# Patient Record
Sex: FEMALE | Race: WHITE | Employment: FULL TIME | URBAN - METROPOLITAN AREA
[De-identification: names, ages, dates, MRNs, and addresses within clinical notes are randomized per-mention and may not be internally consistent; named-entity substitution may affect disease eponyms.]

---

## 2017-08-04 PROBLEM — Z00.00 PHYSICAL EXAM: Status: ACTIVE | Noted: 2017-08-04

## 2017-11-18 PROBLEM — Z23 IMMUNIZATION DUE: Status: ACTIVE | Noted: 2017-11-18

## 2018-09-26 PROBLEM — Z00.00 PHYSICAL EXAM: Status: RESOLVED | Noted: 2017-08-04 | Resolved: 2018-09-26

## 2020-03-25 ENCOUNTER — TELEPHONE (OUTPATIENT)
Dept: FAMILY MEDICINE CLINIC | Age: 25
End: 2020-03-25

## 2020-03-25 NOTE — TELEPHONE ENCOUNTER
MA spoke with pt and informed her to contact office when she is 72 hours fever free without fever reducer.

## 2020-03-25 NOTE — TELEPHONE ENCOUNTER
Pt stated that she is feeling better today but still has a sore throat, nasal drainage, cough and congestion.

## 2020-03-25 NOTE — TELEPHONE ENCOUNTER
Patient states she has a sore throat, runny nose, slight cough, congestion, headache, fever. No SOB, nausea. Patient requesting to go back to work tomorrow (3/26/2020), wants an excuse for 3/23-today.

## 2020-03-25 NOTE — TELEPHONE ENCOUNTER
Patient called in stating she had a sore throat and fever and she needs a work excuse to return to work. She wants to know if she can just come into the office to pick it up? Please, advise. Thank you.

## 2020-03-27 ENCOUNTER — TELEPHONE (OUTPATIENT)
Dept: FAMILY MEDICINE CLINIC | Age: 25
End: 2020-03-27

## 2020-04-23 ENCOUNTER — HOSPITAL ENCOUNTER (EMERGENCY)
Age: 25
Discharge: HOME | End: 2020-04-23
Attending: EMERGENCY MEDICINE
Payer: COMMERCIAL

## 2020-04-23 ENCOUNTER — APPOINTMENT (OUTPATIENT)
Dept: GENERAL RADIOLOGY | Age: 25
End: 2020-04-23
Payer: COMMERCIAL

## 2020-04-23 VITALS
DIASTOLIC BLOOD PRESSURE: 70 MMHG | RESPIRATION RATE: 14 BRPM | TEMPERATURE: 98 F | HEART RATE: 94 BPM | OXYGEN SATURATION: 98 % | HEIGHT: 60 IN | BODY MASS INDEX: 21.99 KG/M2 | WEIGHT: 112 LBS | SYSTOLIC BLOOD PRESSURE: 108 MMHG

## 2020-04-23 PROCEDURE — 99283 EMERGENCY DEPT VISIT LOW MDM: CPT

## 2020-04-23 PROCEDURE — 73110 X-RAY EXAM OF WRIST: CPT

## 2020-04-23 RX ORDER — IBUPROFEN 800 MG/1
800 TABLET ORAL EVERY 6 HOURS PRN
Qty: 12 TABLET | Refills: 0 | Status: SHIPPED | OUTPATIENT
Start: 2020-04-23 | End: 2020-09-09 | Stop reason: ALTCHOICE

## 2020-04-23 ASSESSMENT — PAIN DESCRIPTION - FREQUENCY: FREQUENCY: CONTINUOUS

## 2020-04-23 ASSESSMENT — PAIN DESCRIPTION - ONSET: ONSET: ON-GOING

## 2020-04-23 ASSESSMENT — PAIN DESCRIPTION - PAIN TYPE: TYPE: ACUTE PAIN

## 2020-04-23 ASSESSMENT — PAIN - FUNCTIONAL ASSESSMENT: PAIN_FUNCTIONAL_ASSESSMENT: PREVENTS OR INTERFERES SOME ACTIVE ACTIVITIES AND ADLS

## 2020-04-23 ASSESSMENT — PAIN DESCRIPTION - DESCRIPTORS: DESCRIPTORS: SHARP

## 2020-04-23 ASSESSMENT — PAIN DESCRIPTION - PROGRESSION: CLINICAL_PROGRESSION: GRADUALLY WORSENING

## 2020-04-23 ASSESSMENT — PAIN SCALES - GENERAL: PAINLEVEL_OUTOF10: 7

## 2020-04-23 ASSESSMENT — PAIN DESCRIPTION - ORIENTATION: ORIENTATION: RIGHT

## 2020-04-23 ASSESSMENT — PAIN DESCRIPTION - LOCATION: LOCATION: WRIST

## 2020-04-23 NOTE — LETTER
[address] [facility] Emergency Department  [id number] [facility] [address]  Phone: [phone]               April 23, 2020    Patient: Ellen Honeycutt   YOB: 1995   Date of Visit: 4/23/2020       To Whom It May Concern:    Radha Sahu was seen and treated in our emergency department on 4/23/2020. She may return to work on 04/25/2020 with no restrictions.       Sincerely,     MD Roula Hartman RN         Signature:__________________________________

## 2020-04-23 NOTE — ED PROVIDER NOTES
HPI:  4/23/20,   Time: 9:21 AM EDT         Edith Melendez is a 22 y.o. female presenting to the ED for wrist injury, beginning more than 1 day ago. The complaint has been persistent, moderate in severity, and worsened by nothing, movement of hand and wrist.  Patient states she fell the bathtub injuring her right wrist a couple of days ago. There is no gross deformity. ROS:   Pertinent positives and negatives are stated within HPI, all other systems reviewed and are negative.  --------------------------------------------- PAST HISTORY ---------------------------------------------  Past Medical History:  has no past medical history on file. Past Surgical History:  has no past surgical history on file. Social History:  reports that she has been smoking cigarettes. She has been smoking about 0.50 packs per day. She has never used smokeless tobacco. She reports that she does not drink alcohol or use drugs. Family History: family history includes No Known Problems in her father and mother. The patients home medications have been reviewed. Allergies: Patient has no known allergies. -------------------------------------------------- RESULTS -------------------------------------------------  All laboratory and radiology results have been personally reviewed by myself   LABS:  No results found for this visit on 04/23/20. RADIOLOGY:  Interpreted by Radiologist.  XR WRIST RIGHT (MIN 3 VIEWS)   Final Result   No acute osseous abnormality seen. ------------------------- NURSING NOTES AND VITALS REVIEWED ---------------------------   The nursing notes within the ED encounter and vital signs as below have been reviewed.    /70   Pulse 94   Temp 98 °F (36.7 °C) (Oral)   Resp 14   Ht 5' (1.524 m)   Wt 112 lb (50.8 kg)   SpO2 98%   BMI 21.87 kg/m²   Oxygen Saturation Interpretation: Normal      ---------------------------------------------------PHYSICAL EXAM--------------------------------------      Constitutional/General: Alert and oriented x3, well appearing, non toxic in NAD  Head: NC/AT  Eyes: PERRL, EOMI  Mouth: Oropharynx clear, handling secretions, no trismus  Neck: Supple, full ROM, no meningeal signs  Pulmonary: Lungs clear to auscultation bilaterally, no wheezes, rales, or rhonchi. Not in respiratory distress  Cardiovascular:  Regular rate and rhythm, no murmurs, gallops, or rubs. 2+ distal pulses  Abdomen: Soft, non tender, non distended,   Extremities: Moves all extremities x 4. Warm and well perfused; patient has good radial pulse. She is tender over the ulnar styloid area of the right wrist there is no gross bony deformity. She is not tender over the snuffbox. Median radial and ulnar nerves were checked and are intact. She does have limited dorsiflexion due to pain. There is good capillary refill. Skin: warm and dry without rash  Neurologic: GCS 15,  Psych: Normal Affect      ------------------------------ ED COURSE/MEDICAL DECISION MAKING----------------------  Medications - No data to display      Medical Decision Making:   Exam and x-ray, x-ray was negative for fracture. Patient will be splinted and discharged with anti-inflammatories. She is advised that if pain continues may need MRI or orthopedic consult    Counseling: The emergency provider has spoken with the patient and discussed todays results, in addition to providing specific details for the plan of care and counseling regarding the diagnosis and prognosis. Questions are answered at this time and they are agreeable with the plan.      --------------------------------- IMPRESSION AND DISPOSITION ---------------------------------    IMPRESSION  1.  Sprain of right wrist, initial encounter        DISPOSITION  Disposition: Discharge to home  Patient condition is fair                  Redd Amaya MD  04/23/20 [id number]

## 2020-04-24 ENCOUNTER — TELEPHONE (OUTPATIENT)
Dept: PRIMARY CARE CLINIC | Age: 25
End: 2020-04-24

## 2020-04-27 NOTE — TELEPHONE ENCOUNTER
Second attempt to call the patient for ED follow up. There was no answer. Left message for the patient, if their symptoms worsened or persists, they were instructed to call their PCP or return to the ED for evaluation.      Christie Walter RN, [employer]

## 2020-07-13 ENCOUNTER — TELEPHONE (OUTPATIENT)
Dept: FAMILY MEDICINE CLINIC | Age: 25
End: 2020-07-13

## 2020-07-13 NOTE — TELEPHONE ENCOUNTER
Pt called in she felll down the stairs and went to [facility] where she got stitches in her LT knee and wanted to know if Dr. Kayy Kwon would be able to give her a work excuse? Please, advise.

## 2020-07-13 NOTE — TELEPHONE ENCOUNTER
Patient calling back, states that she called this morning and hasn't heard back from the office yet. I tried calling the office but didn't get an answer, told her that she called around 1:40 today and that the office hasn't had time to address her. I told her that I would put in another PE, and that someone would get back with her soon. Shona Coleman

## 2020-07-13 NOTE — TELEPHONE ENCOUNTER
Patient notified we are unable to provide a work excuse. She has not been seen in the office regarding this injury.   Electronically signed by Amada Samaniego on 7/13/2020 at 3:43 PM

## 2020-09-09 ENCOUNTER — OFFICE VISIT (OUTPATIENT)
Dept: FAMILY MEDICINE CLINIC | Age: 25
End: 2020-09-09
Payer: COMMERCIAL

## 2020-09-09 VITALS
RESPIRATION RATE: 16 BRPM | HEART RATE: 79 BPM | DIASTOLIC BLOOD PRESSURE: 62 MMHG | TEMPERATURE: 98.3 F | HEIGHT: 60 IN | BODY MASS INDEX: 20.18 KG/M2 | SYSTOLIC BLOOD PRESSURE: 100 MMHG | OXYGEN SATURATION: 97 % | WEIGHT: 102.8 LBS

## 2020-09-09 PROCEDURE — 99213 OFFICE O/P EST LOW 20 MIN: CPT | Performed by: FAMILY MEDICINE

## 2020-09-09 ASSESSMENT — ENCOUNTER SYMPTOMS
CHEST TIGHTNESS: 0
TROUBLE SWALLOWING: 0
EYE DISCHARGE: 0
CHOKING: 0
BLOOD IN STOOL: 0
SHORTNESS OF BREATH: 0
DIARRHEA: 0
BACK PAIN: 0
FACIAL SWELLING: 0
STRIDOR: 0
WHEEZING: 0
VOMITING: 0
APNEA: 0
SINUS PRESSURE: 0
NAUSEA: 0
ALLERGIC/IMMUNOLOGIC NEGATIVE: 1
ANAL BLEEDING: 0
VOICE CHANGE: 0
COLOR CHANGE: 0
SINUS PAIN: 0
EYE PAIN: 0
RESPIRATORY NEGATIVE: 1
RECTAL PAIN: 0
SORE THROAT: 0
GASTROINTESTINAL NEGATIVE: 1
EYE REDNESS: 0
COUGH: 0
RHINORRHEA: 0
PHOTOPHOBIA: 0
EYE ITCHING: 0
BLURRED VISION: 0
ORTHOPNEA: 0
ABDOMINAL PAIN: 0
ABDOMINAL DISTENTION: 0
CONSTIPATION: 0

## 2020-09-09 ASSESSMENT — PATIENT HEALTH QUESTIONNAIRE - PHQ9
SUM OF ALL RESPONSES TO PHQ9 QUESTIONS 1 & 2: 0
2. FEELING DOWN, DEPRESSED OR HOPELESS: 0
SUM OF ALL RESPONSES TO PHQ QUESTIONS 1-9: 0
1. LITTLE INTEREST OR PLEASURE IN DOING THINGS: 0
SUM OF ALL RESPONSES TO PHQ QUESTIONS 1-9: 0

## 2020-09-09 NOTE — PROGRESS NOTES
SUBJECTIVE  Yue Estrella is a 26 y.o. female. HPI/Chief C/O:  Chief Complaint   Patient presents with    Annual Exam     Physical    Weight Management     Patient wants to discuss medications to help her gain weight. No Known Allergies  The patient is here for a medication list and treatment planning review  We will go over our care planning goals as well as take care of all refills  We will set up labs as well   She C/O having trouble gaining weight    Hypertension   Pertinent negatives include no anxiety, blurred vision, chest pain, headaches, malaise/fatigue, neck pain, orthopnea, palpitations, peripheral edema, PND, shortness of breath or sweats. Past treatments include lifestyle changes. The current treatment provides significant improvement. Compliance problems include diet. There is no history of angina, kidney disease, CAD/MI, CVA, heart failure, left ventricular hypertrophy, PVD or retinopathy. There is no history of chronic renal disease, coarctation of the aorta, hyperaldosteronism, hypercortisolism, hyperparathyroidism, a hypertension causing med, pheochromocytoma, renovascular disease, sleep apnea or a thyroid problem. ROS:  Review of Systems   Constitutional: Negative. Negative for activity change, appetite change, chills, diaphoresis, fatigue, fever, malaise/fatigue and unexpected weight change. HENT: Negative. Negative for congestion, dental problem, drooling, ear discharge, ear pain, facial swelling, hearing loss, mouth sores, nosebleeds, postnasal drip, rhinorrhea, sinus pressure, sinus pain, sneezing, sore throat, tinnitus, trouble swallowing and voice change. Eyes: Negative for blurred vision, photophobia, pain, discharge, redness, itching and visual disturbance. Respiratory: Negative. Negative for apnea, cough, choking, chest tightness, shortness of breath, wheezing and stridor. Cardiovascular: Negative.   Negative for chest pain, palpitations, orthopnea, leg distension. Palpations: Abdomen is soft. There is no mass. Tenderness: There is no abdominal tenderness. There is no right CVA tenderness, left CVA tenderness, guarding or rebound. Hernia: No hernia is present. Genitourinary:     Vagina: Normal.   Musculoskeletal: Normal range of motion. General: No swelling, tenderness, deformity or signs of injury. Right lower leg: No edema. Left lower leg: No edema. Legs:       Comments: Large scar    Lymphadenopathy:      Cervical: No cervical adenopathy. Skin:     General: Skin is warm. Coloration: Skin is not jaundiced or pale. Findings: No bruising, erythema, lesion or rash. Neurological:      General: No focal deficit present. Mental Status: She is alert and oriented to person, place, and time. Cranial Nerves: No cranial nerve deficit. Sensory: No sensory deficit. Motor: No weakness or abnormal muscle tone. Coordination: Coordination normal.      Gait: Gait normal.      Deep Tendon Reflexes: Reflexes are normal and symmetric. Reflexes normal.   Psychiatric:         Mood and Affect: Mood normal.         Behavior: Behavior normal.         Thought Content: Thought content normal.         Judgment: Judgment normal.         ASSESSMENT/PLAN  Debbie was seen today for annual exam and weight management. Diagnoses and all orders for this visit:    Well adult exam  --talk on diet, exercise, vitamins, smoking, drugs alcohol, sexual activity     IFG (impaired fasting glucose)  -     Comprehensive Metabolic Panel; Future  -     CBC Auto Differential; Future  -     Hemoglobin A1C; Future    Dyslipidemia  -     Comprehensive Metabolic Panel; Future  -     Lipid Panel;  Future  -     CBC Auto Differential; Future  --Mediterranean diet, exercise, weight loss, vitamins    We have a long talk on cholesterol and importance of lowering it   --add Ensure for weight gain     Fatigue, unspecified type  -     TSH without Reflex; Future  -     Comprehensive Metabolic Panel; Future  -     CBC Auto Differential; Future        Outpatient Encounter Medications as of 9/9/2020   Medication Sig Dispense Refill    [DISCONTINUED] ibuprofen (ADVIL;MOTRIN) 800 MG tablet Take 1 tablet by mouth every 6 hours as needed for Pain 12 tablet 0     No facility-administered encounter medications on file as of 9/9/2020. Return in about 4 weeks (around 10/7/2020).         Reviewed recent labs related to [address] [facility] [address] current problems      Discussed importance of regular Health Maintenance follow up  Health Maintenance   Topic    Pneumococcal 0-64 years Vaccine (1 of 1 - PPSV23)    HIV screen     Chlamydia screen     Varicella vaccine (2 of 2 - 13+ 2-dose series)    Cervical cancer screen     Flu vaccine (1)    DTaP/Tdap/Td vaccine (7 - Td)    Hepatitis B vaccine     Hib vaccine     HPV vaccine     Meningococcal (ACWY) vaccine     Hepatitis A vaccine

## 2020-09-09 NOTE — PATIENT INSTRUCTIONS
Patient Education        Eating Healthy Foods: Care Instructions  Your Care Instructions     Eating healthy foods can help lower your risk for disease. Healthy food gives you energy and keeps your heart strong, your brain active, your muscles working, and your bones strong. A healthy diet includes a variety of foods from the basic food groups: grains, vegetables, fruits, milk and milk products, and meat and beans. Some people may eat more of their favorite foods from only one food group and, as a result, miss getting the nutrients they need. So, it is important to pay attention not only to what you eat but also to what you are missing from your diet. You can eat a healthy, balanced diet by making a few small changes. Follow-up care is a key part of your treatment and safety. Be sure to make and go to all appointments, and call your doctor if you are having problems. It's also a good idea to know your test results and keep a list of the medicines you take. How can you care for yourself at home? Look at what you eat  · Keep a food diary for a week or two and record everything you eat or drink. Track the number of servings you eat from each food group. · For a balanced diet every day, eat a variety of:  ? 6 or more ounce-equivalents of grains, such as cereals, breads, crackers, rice, or pasta, every day. An ounce-equivalent is 1 slice of bread, 1 cup of ready-to-eat cereal, or ½ cup of cooked rice, cooked pasta, or cooked cereal.  ? 2½ cups of vegetables, especially:  § Dark-green vegetables such as broccoli and spinach. § Orange vegetables such as carrots and sweet potatoes. § Dry beans (such as omer and kidney beans) and peas (such as lentils). ? 2 cups of fresh, frozen, or canned fruit. A small apple or 1 banana or orange equals 1 cup. ? 3 cups of nonfat or low-fat milk, yogurt, or other milk products. ? 5½ ounces of meat and beans, such as chicken, fish, lean meat, beans, nuts, and seeds.  One egg, 1 home?  · Reach and stay at a healthy weight. This will lower your risk for many problems, such as obesity, diabetes, heart disease, and high blood pressure. · Get at least 30 minutes of physical activity on most days of the week. Walking is a good choice. You also may want to do other activities, such as running, swimming, cycling, or playing tennis or team sports. Discuss any changes in your exercise program with your doctor. · Do not smoke or allow others to smoke around you. If you need help quitting, talk to your doctor about stop-smoking programs and medicines. These can increase your chances of quitting for good. · Talk to your doctor about whether you have any risk factors for sexually transmitted infections (STIs). Having one sex partner (who does not have STIs and does not have sex with anyone else) is a good way to avoid these infections. · Use birth control if you do not want to have children at this time. Talk with your doctor about the choices available and what might be best for you. · Protect your skin from too much sun. When you're outdoors from 10 a.m. to 4 p.m., stay in the shade or cover up with clothing and a hat with a wide brim. Wear sunglasses that block UV rays. Even when it's cloudy, put broad-spectrum sunscreen (SPF 30 or higher) on any exposed skin. · See a dentist one or two times a year for checkups and to have your teeth cleaned. · Wear a seat belt in the car. Follow your doctor's advice about when to have certain tests. These tests can spot problems early. For everyone  · Cholesterol. Have the fat (cholesterol) in your blood tested after age 21. Your doctor will tell you how often to have this done based on your age, family history, or other things that can increase your risk for heart disease. · Blood pressure. Have your blood pressure checked during a routine doctor visit.  Your doctor will tell you how often to check your blood pressure based on your age, your blood pressure results, and other factors. · Vision. Talk with your doctor about how often to have a glaucoma test.  · Diabetes. Ask your doctor whether you should have tests for diabetes. · Colon cancer. Your risk for colorectal cancer gets higher as you get older. Some experts say that adults should start regular screening at age 48 and stop at age 76. Others say to start before age 48 or continue after age 76. Talk with your doctor about your risk and when to start and stop screening. For women  · Breast exam and mammogram. Talk to your doctor about when you should have a clinical breast exam and a mammogram. Medical experts differ on whether and how often women under 50 should have these tests. Your doctor can help you decide what is right for you. · Cervical cancer screening test and pelvic exam. Begin with a Pap test at age 24. The test often is part of a pelvic exam. Starting at age 27, you may choose to have a Pap test, an HPV test, or both tests at the same time (called co-testing). Talk with your doctor about how often to have testing. · Tests for sexually transmitted infections (STIs). Ask whether you should have tests for STIs. You may be at risk if you have sex with more than one person, especially if your partners do not wear condoms. For men  · Tests for sexually transmitted infections (STIs). Ask whether you should have tests for STIs. You may be at risk if you have sex with more than one person, especially if you do not wear a condom. · Testicular cancer exam. Ask your doctor whether you should check your testicles regularly. · Prostate exam. Talk to your doctor about whether you should have a blood test (called a PSA test) for prostate cancer. Experts differ on whether and when men should have this test. Some experts suggest it if you are older than 39 and are -American or have a father or brother who got prostate cancer when he was younger than 72. When should you call for help?   Watch closely for changes in your health, and be sure to contact your doctor if you have any problems or symptoms that concern you. Where can you learn more? Go to https://chpeedvineweb.[email or web address] org and sign in to your [email or web address] account. Enter P072 in the TOMODO box to learn more about \"Well Visit, Ages 25 to 48: Care Instructions. \"     If you do not have an account, please click on the \"Sign Up Now\" link. Current as of: August 22, 2019               Content Version: 12.5  © 1869-0932 Healthwise, Incorporated. Care instructions adapted under license by [facility] ([facility]). If you have questions about a medical condition or this instruction, always ask your healthcare professional. [address] any warranty or liability for your use of this information.

## 2022-08-01 ENCOUNTER — HOSPITAL ENCOUNTER (OUTPATIENT)
Age: 27
Discharge: HOME | End: 2022-08-01
Payer: COMMERCIAL

## 2022-08-01 LAB
GLUCOSE TOLERANCE SCREEN 50G: 86 MG/DL (ref 70–140)
HCT VFR BLD CALC: 34.5 % (ref 34–48)
HEMOGLOBIN: 11.3 G/DL (ref 11.5–15.5)
MCH RBC QN AUTO: 31.7 PG (ref 26–35)
MCHC RBC AUTO-ENTMCNC: 32.8 % (ref 32–34.5)
MCV RBC AUTO: 96.9 FL (ref 80–99.9)
PDW BLD-RTO: 12.6 FL (ref 11.5–15)
PLATELET # BLD: 153 E9/L (ref 130–450)
PMV BLD AUTO: 10 FL (ref 7–12)
RBC # BLD: 3.56 E12/L (ref 3.5–5.5)
WBC # BLD: 9.9 E9/L (ref 4.5–11.5)

## 2022-08-01 PROCEDURE — 36415 COLL VENOUS BLD VENIPUNCTURE: CPT

## 2022-08-01 PROCEDURE — 82950 GLUCOSE TEST: CPT

## 2022-08-01 PROCEDURE — 85027 COMPLETE CBC AUTOMATED: CPT

## 2022-10-09 LAB — GROUP B STREP CULTURE: NORMAL

## 2022-10-24 ENCOUNTER — HOSPITAL ENCOUNTER (INPATIENT)
Age: 27
LOS: 3 days | Discharge: HOME | DRG: 540 | End: 2022-10-27
Attending: OBSTETRICS & GYNECOLOGY | Admitting: OBSTETRICS & GYNECOLOGY
Payer: MEDICAID

## 2022-10-24 ENCOUNTER — ANESTHESIA EVENT (OUTPATIENT)
Dept: LABOR AND DELIVERY | Age: 27
DRG: 540 | End: 2022-10-24
Payer: MEDICAID

## 2022-10-24 ENCOUNTER — ANESTHESIA (OUTPATIENT)
Dept: LABOR AND DELIVERY | Age: 27
DRG: 540 | End: 2022-10-24
Payer: MEDICAID

## 2022-10-24 PROBLEM — Z3A.39 39 WEEKS GESTATION OF PREGNANCY: Status: ACTIVE | Noted: 2022-10-24

## 2022-10-24 PROBLEM — O32.8XX0 FOOTLING BREECH PRESENTATION: Status: ACTIVE | Noted: 2022-10-24

## 2022-10-24 PROBLEM — Z34.90 TERM PREGNANCY: Status: ACTIVE | Noted: 2022-10-24

## 2022-10-24 LAB
ABO/RH: NORMAL
ALBUMIN SERPL-MCNC: 3.8 G/DL (ref 3.5–5.2)
ALP BLD-CCNC: 167 U/L (ref 35–104)
ALT SERPL-CCNC: 11 U/L (ref 0–32)
AMPHETAMINE SCREEN, URINE: NOT DETECTED
ANION GAP SERPL CALCULATED.3IONS-SCNC: 11 MMOL/L (ref 7–16)
ANTIBODY SCREEN: NORMAL
AST SERPL-CCNC: 17 U/L (ref 0–31)
BARBITURATE SCREEN URINE: NOT DETECTED
BENZODIAZEPINE SCREEN, URINE: NOT DETECTED
BILIRUB SERPL-MCNC: 0.2 MG/DL (ref 0–1.2)
BUN BLDV-MCNC: 10 MG/DL (ref 6–20)
CALCIUM SERPL-MCNC: 9 MG/DL (ref 8.6–10.2)
CANNABINOID SCREEN URINE: NOT DETECTED
CHLORIDE BLD-SCNC: 103 MMOL/L (ref 98–107)
CO2: 21 MMOL/L (ref 22–29)
COCAINE METABOLITE SCREEN URINE: NOT DETECTED
CREAT SERPL-MCNC: 0.8 MG/DL (ref 0.5–1)
FENTANYL SCREEN, URINE: NOT DETECTED
GFR SERPL CREATININE-BSD FRML MDRD: >60 ML/MIN/1.73
GLUCOSE BLD-MCNC: 77 MG/DL (ref 74–99)
HCT VFR BLD CALC: 36.4 % (ref 34–48)
HEMOGLOBIN: 12.3 G/DL (ref 11.5–15.5)
Lab: NORMAL
MCH RBC QN AUTO: 31.1 PG (ref 26–35)
MCHC RBC AUTO-ENTMCNC: 33.8 % (ref 32–34.5)
MCV RBC AUTO: 92.2 FL (ref 80–99.9)
METHADONE SCREEN, URINE: NOT DETECTED
OPIATE SCREEN URINE: NOT DETECTED
OXYCODONE URINE: NOT DETECTED
PDW BLD-RTO: 12.3 FL (ref 11.5–15)
PHENCYCLIDINE SCREEN URINE: NOT DETECTED
PLATELET # BLD: 156 E9/L (ref 130–450)
PMV BLD AUTO: 11.6 FL (ref 7–12)
POTASSIUM SERPL-SCNC: 3.9 MMOL/L (ref 3.5–5)
RBC # BLD: 3.95 E12/L (ref 3.5–5.5)
SODIUM BLD-SCNC: 135 MMOL/L (ref 132–146)
TOTAL PROTEIN: 7 G/DL (ref 6.4–8.3)
WBC # BLD: 11.7 E9/L (ref 4.5–11.5)

## 2022-10-24 PROCEDURE — 3609079900 HC CESAREAN SECTION: Performed by: OBSTETRICS & GYNECOLOGY

## 2022-10-24 PROCEDURE — 3700000001 HC ADD 15 MINUTES (ANESTHESIA): Performed by: OBSTETRICS & GYNECOLOGY

## 2022-10-24 PROCEDURE — 3700000000 HC ANESTHESIA ATTENDED CARE: Performed by: OBSTETRICS & GYNECOLOGY

## 2022-10-24 PROCEDURE — 86900 BLOOD TYPING SEROLOGIC ABO: CPT

## 2022-10-24 PROCEDURE — 2580000003 HC RX 258: Performed by: NURSE ANESTHETIST, CERTIFIED REGISTERED

## 2022-10-24 PROCEDURE — 6360000002 HC RX W HCPCS: Performed by: NURSE ANESTHETIST, CERTIFIED REGISTERED

## 2022-10-24 PROCEDURE — 80053 COMPREHEN METABOLIC PANEL: CPT

## 2022-10-24 PROCEDURE — 86850 RBC ANTIBODY SCREEN: CPT

## 2022-10-24 PROCEDURE — 99223 1ST HOSP IP/OBS HIGH 75: CPT | Performed by: ADVANCED PRACTICE MIDWIFE

## 2022-10-24 PROCEDURE — 7100000001 HC PACU RECOVERY - ADDTL 15 MIN: Performed by: OBSTETRICS & GYNECOLOGY

## 2022-10-24 PROCEDURE — 2500000003 HC RX 250 WO HCPCS: Performed by: NURSE ANESTHETIST, CERTIFIED REGISTERED

## 2022-10-24 PROCEDURE — 1220000001 HC SEMI PRIVATE L&D R&B

## 2022-10-24 PROCEDURE — 2580000003 HC RX 258: Performed by: OBSTETRICS & GYNECOLOGY

## 2022-10-24 PROCEDURE — 51702 INSERT TEMP BLADDER CATH: CPT

## 2022-10-24 PROCEDURE — 6360000002 HC RX W HCPCS: Performed by: OBSTETRICS & GYNECOLOGY

## 2022-10-24 PROCEDURE — 7100000000 HC PACU RECOVERY - FIRST 15 MIN: Performed by: OBSTETRICS & GYNECOLOGY

## 2022-10-24 PROCEDURE — 86901 BLOOD TYPING SEROLOGIC RH(D): CPT

## 2022-10-24 PROCEDURE — 80307 DRUG TEST PRSMV CHEM ANLYZR: CPT

## 2022-10-24 PROCEDURE — 2709999900 HC NON-CHARGEABLE SUPPLY: Performed by: OBSTETRICS & GYNECOLOGY

## 2022-10-24 PROCEDURE — 6370000000 HC RX 637 (ALT 250 FOR IP): Performed by: OBSTETRICS & GYNECOLOGY

## 2022-10-24 PROCEDURE — 85027 COMPLETE CBC AUTOMATED: CPT

## 2022-10-24 PROCEDURE — 36415 COLL VENOUS BLD VENIPUNCTURE: CPT

## 2022-10-24 RX ORDER — SODIUM CHLORIDE 0.9 % (FLUSH) 0.9 %
5-40 SYRINGE (ML) INJECTION PRN
Status: DISCONTINUED | OUTPATIENT
Start: 2022-10-24 | End: 2022-10-27 | Stop reason: HOSPADM

## 2022-10-24 RX ORDER — NALBUPHINE HCL 10 MG/ML
5 AMPUL (ML) INJECTION
Status: DISCONTINUED | OUTPATIENT
Start: 2022-10-24 | End: 2022-10-27 | Stop reason: HOSPADM

## 2022-10-24 RX ORDER — SODIUM CHLORIDE 0.9 % (FLUSH) 0.9 %
10 SYRINGE (ML) INJECTION PRN
Status: DISCONTINUED | OUTPATIENT
Start: 2022-10-24 | End: 2022-10-24

## 2022-10-24 RX ORDER — NALOXONE HYDROCHLORIDE 0.4 MG/ML
0.4 INJECTION, SOLUTION INTRAMUSCULAR; INTRAVENOUS; SUBCUTANEOUS
Status: ACTIVE | OUTPATIENT
Start: 2022-10-24 | End: 2022-10-25

## 2022-10-24 RX ORDER — OXYCODONE HYDROCHLORIDE 5 MG/1
5 TABLET ORAL EVERY 4 HOURS PRN
Status: DISCONTINUED | OUTPATIENT
Start: 2022-10-24 | End: 2022-10-27 | Stop reason: HOSPADM

## 2022-10-24 RX ORDER — BUPIVACAINE HYDROCHLORIDE 7.5 MG/ML
INJECTION, SOLUTION INTRASPINAL PRN
Status: DISCONTINUED | OUTPATIENT
Start: 2022-10-24 | End: 2022-10-24 | Stop reason: SDUPTHER

## 2022-10-24 RX ORDER — KETOROLAC TROMETHAMINE 30 MG/ML
30 INJECTION, SOLUTION INTRAMUSCULAR; INTRAVENOUS ONCE
Status: DISCONTINUED | OUTPATIENT
Start: 2022-10-24 | End: 2022-10-27 | Stop reason: HOSPADM

## 2022-10-24 RX ORDER — IBUPROFEN 800 MG/1
800 TABLET ORAL EVERY 8 HOURS PRN
Status: DISCONTINUED | OUTPATIENT
Start: 2022-10-24 | End: 2022-10-24

## 2022-10-24 RX ORDER — FERROUS SULFATE 325(65) MG
325 TABLET ORAL 2 TIMES DAILY WITH MEALS
Status: DISCONTINUED | OUTPATIENT
Start: 2022-10-25 | End: 2022-10-27 | Stop reason: HOSPADM

## 2022-10-24 RX ORDER — OXYCODONE HYDROCHLORIDE 5 MG/1
5 TABLET ORAL EVERY 4 HOURS PRN
Status: DISCONTINUED | OUTPATIENT
Start: 2022-10-24 | End: 2022-10-24 | Stop reason: SDUPTHER

## 2022-10-24 RX ORDER — MODIFIED LANOLIN
OINTMENT (GRAM) TOPICAL
Status: DISCONTINUED | OUTPATIENT
Start: 2022-10-24 | End: 2022-10-27 | Stop reason: HOSPADM

## 2022-10-24 RX ORDER — MISOPROSTOL 200 UG/1
800 TABLET ORAL PRN
Status: DISCONTINUED | OUTPATIENT
Start: 2022-10-24 | End: 2022-10-27 | Stop reason: HOSPADM

## 2022-10-24 RX ORDER — DIPHENHYDRAMINE HYDROCHLORIDE 50 MG/ML
INJECTION INTRAMUSCULAR; INTRAVENOUS PRN
Status: DISCONTINUED | OUTPATIENT
Start: 2022-10-24 | End: 2022-10-24 | Stop reason: SDUPTHER

## 2022-10-24 RX ORDER — SODIUM CHLORIDE 9 MG/ML
INJECTION, SOLUTION INTRAVENOUS PRN
Status: DISCONTINUED | OUTPATIENT
Start: 2022-10-24 | End: 2022-10-24

## 2022-10-24 RX ORDER — ACETAMINOPHEN 500 MG
1000 TABLET ORAL EVERY 8 HOURS PRN
Status: DISCONTINUED | OUTPATIENT
Start: 2022-10-24 | End: 2022-10-27 | Stop reason: HOSPADM

## 2022-10-24 RX ORDER — SODIUM CHLORIDE, SODIUM LACTATE, POTASSIUM CHLORIDE, CALCIUM CHLORIDE 600; 310; 30; 20 MG/100ML; MG/100ML; MG/100ML; MG/100ML
INJECTION, SOLUTION INTRAVENOUS CONTINUOUS
Status: DISCONTINUED | OUTPATIENT
Start: 2022-10-24 | End: 2022-10-27 | Stop reason: HOSPADM

## 2022-10-24 RX ORDER — ENOXAPARIN SODIUM 100 MG/ML
40 INJECTION SUBCUTANEOUS DAILY
Status: DISCONTINUED | OUTPATIENT
Start: 2022-10-24 | End: 2022-10-27 | Stop reason: HOSPADM

## 2022-10-24 RX ORDER — ONDANSETRON 4 MG/1
8 TABLET, ORALLY DISINTEGRATING ORAL EVERY 8 HOURS PRN
Status: DISCONTINUED | OUTPATIENT
Start: 2022-10-24 | End: 2022-10-27 | Stop reason: HOSPADM

## 2022-10-24 RX ORDER — KETOROLAC TROMETHAMINE 30 MG/ML
30 INJECTION, SOLUTION INTRAMUSCULAR; INTRAVENOUS EVERY 6 HOURS PRN
Status: DISCONTINUED | OUTPATIENT
Start: 2022-10-24 | End: 2022-10-25

## 2022-10-24 RX ORDER — OXYTOCIN/0.9 % SODIUM CHLORIDE 30/500 ML
909 PLASTIC BAG, INJECTION (ML) INTRAVENOUS PRN
Status: DISCONTINUED | OUTPATIENT
Start: 2022-10-24 | End: 2022-10-24

## 2022-10-24 RX ORDER — SODIUM CHLORIDE, SODIUM LACTATE, POTASSIUM CHLORIDE, CALCIUM CHLORIDE 600; 310; 30; 20 MG/100ML; MG/100ML; MG/100ML; MG/100ML
INJECTION, SOLUTION INTRAVENOUS CONTINUOUS PRN
Status: DISCONTINUED | OUTPATIENT
Start: 2022-10-24 | End: 2022-10-24 | Stop reason: SDUPTHER

## 2022-10-24 RX ORDER — TRISODIUM CITRATE DIHYDRATE AND CITRIC ACID MONOHYDRATE 500; 334 MG/5ML; MG/5ML
30 SOLUTION ORAL ONCE
Status: COMPLETED | OUTPATIENT
Start: 2022-10-24 | End: 2022-10-24

## 2022-10-24 RX ORDER — SODIUM CHLORIDE, SODIUM LACTATE, POTASSIUM CHLORIDE, AND CALCIUM CHLORIDE .6; .31; .03; .02 G/100ML; G/100ML; G/100ML; G/100ML
1000 INJECTION, SOLUTION INTRAVENOUS ONCE
Status: COMPLETED | OUTPATIENT
Start: 2022-10-24 | End: 2022-10-24

## 2022-10-24 RX ORDER — ONDANSETRON 2 MG/ML
INJECTION INTRAMUSCULAR; INTRAVENOUS PRN
Status: DISCONTINUED | OUTPATIENT
Start: 2022-10-24 | End: 2022-10-24 | Stop reason: SDUPTHER

## 2022-10-24 RX ORDER — DEXAMETHASONE SODIUM PHOSPHATE 10 MG/ML
INJECTION, SOLUTION INTRAMUSCULAR; INTRAVENOUS PRN
Status: DISCONTINUED | OUTPATIENT
Start: 2022-10-24 | End: 2022-10-24 | Stop reason: SDUPTHER

## 2022-10-24 RX ORDER — SODIUM CHLORIDE 0.9 % (FLUSH) 0.9 %
5-40 SYRINGE (ML) INJECTION EVERY 12 HOURS SCHEDULED
Status: DISCONTINUED | OUTPATIENT
Start: 2022-10-24 | End: 2022-10-27 | Stop reason: HOSPADM

## 2022-10-24 RX ORDER — SODIUM CHLORIDE, SODIUM LACTATE, POTASSIUM CHLORIDE, CALCIUM CHLORIDE 600; 310; 30; 20 MG/100ML; MG/100ML; MG/100ML; MG/100ML
INJECTION, SOLUTION INTRAVENOUS CONTINUOUS
Status: DISCONTINUED | OUTPATIENT
Start: 2022-10-24 | End: 2022-10-24

## 2022-10-24 RX ORDER — DOCUSATE SODIUM 100 MG/1
100 CAPSULE, LIQUID FILLED ORAL 2 TIMES DAILY
Status: DISCONTINUED | OUTPATIENT
Start: 2022-10-24 | End: 2022-10-27 | Stop reason: HOSPADM

## 2022-10-24 RX ORDER — DIPHENHYDRAMINE HYDROCHLORIDE 50 MG/ML
25 INJECTION INTRAMUSCULAR; INTRAVENOUS
Status: ACTIVE | OUTPATIENT
Start: 2022-10-24 | End: 2022-10-25

## 2022-10-24 RX ORDER — SODIUM CHLORIDE 0.9 % (FLUSH) 0.9 %
10 SYRINGE (ML) INJECTION EVERY 12 HOURS SCHEDULED
Status: DISCONTINUED | OUTPATIENT
Start: 2022-10-24 | End: 2022-10-24

## 2022-10-24 RX ORDER — MORPHINE SULFATE 0.5 MG/ML
INJECTION, SOLUTION EPIDURAL; INTRATHECAL; INTRAVENOUS PRN
Status: DISCONTINUED | OUTPATIENT
Start: 2022-10-24 | End: 2022-10-24 | Stop reason: SDUPTHER

## 2022-10-24 RX ORDER — OXYCODONE HYDROCHLORIDE 5 MG/1
10 TABLET ORAL EVERY 4 HOURS PRN
Status: DISCONTINUED | OUTPATIENT
Start: 2022-10-24 | End: 2022-10-27 | Stop reason: HOSPADM

## 2022-10-24 RX ORDER — SODIUM CHLORIDE 9 MG/ML
INJECTION, SOLUTION INTRAVENOUS PRN
Status: DISCONTINUED | OUTPATIENT
Start: 2022-10-24 | End: 2022-10-27 | Stop reason: HOSPADM

## 2022-10-24 RX ORDER — METHYLERGONOVINE MALEATE 0.2 MG/ML
200 INJECTION INTRAVENOUS PRN
Status: DISCONTINUED | OUTPATIENT
Start: 2022-10-24 | End: 2022-10-27 | Stop reason: HOSPADM

## 2022-10-24 RX ADMIN — MORPHINE SULFATE 4.85 MG: 0.5 INJECTION, SOLUTION EPIDURAL; INTRATHECAL; INTRAVENOUS at 18:54

## 2022-10-24 RX ADMIN — ONDANSETRON 4 MG: 2 INJECTION INTRAMUSCULAR; INTRAVENOUS at 18:51

## 2022-10-24 RX ADMIN — DIPHENHYDRAMINE HYDROCHLORIDE 50 MG: 50 INJECTION INTRAMUSCULAR; INTRAVENOUS at 18:51

## 2022-10-24 RX ADMIN — SODIUM CHLORIDE, POTASSIUM CHLORIDE, SODIUM LACTATE AND CALCIUM CHLORIDE: 600; 310; 30; 20 INJECTION, SOLUTION INTRAVENOUS at 18:28

## 2022-10-24 RX ADMIN — Medication 150 ML: at 18:50

## 2022-10-24 RX ADMIN — BUPIVACAINE HYDROCHLORIDE IN DEXTROSE 1.8 ML: 7.5 INJECTION, SOLUTION SUBARACHNOID at 18:34

## 2022-10-24 RX ADMIN — SODIUM CHLORIDE, POTASSIUM CHLORIDE, SODIUM LACTATE AND CALCIUM CHLORIDE 1000 ML: 600; 310; 30; 20 INJECTION, SOLUTION INTRAVENOUS at 16:40

## 2022-10-24 RX ADMIN — MORPHINE SULFATE 0.15 MG: 0.5 INJECTION, SOLUTION EPIDURAL; INTRATHECAL; INTRAVENOUS at 18:34

## 2022-10-24 RX ADMIN — SODIUM CITRATE AND CITRIC ACID MONOHYDRATE 30 ML: 500; 334 SOLUTION ORAL at 18:21

## 2022-10-24 RX ADMIN — CEFOXITIN SODIUM 2000 MG: 2 POWDER, FOR SOLUTION INTRAVENOUS at 18:21

## 2022-10-24 RX ADMIN — PHENYLEPHRINE HYDROCHLORIDE 100 MCG: 10 INJECTION INTRAVENOUS at 18:36

## 2022-10-24 RX ADMIN — SODIUM CHLORIDE, POTASSIUM CHLORIDE, SODIUM LACTATE AND CALCIUM CHLORIDE: 600; 310; 30; 20 INJECTION, SOLUTION INTRAVENOUS at 19:40

## 2022-10-24 RX ADMIN — SODIUM CHLORIDE, POTASSIUM CHLORIDE, SODIUM LACTATE AND CALCIUM CHLORIDE: 600; 310; 30; 20 INJECTION, SOLUTION INTRAVENOUS at 17:33

## 2022-10-24 RX ADMIN — DEXAMETHASONE SODIUM PHOSPHATE 10 MG: 10 INJECTION INTRAMUSCULAR; INTRAVENOUS at 18:51

## 2022-10-24 ASSESSMENT — LIFESTYLE VARIABLES: SMOKING_STATUS: 1

## 2022-10-24 NOTE — PROGRESS NOTES
Patient to recovery in stable condition. RN will remain with patient over the next 2 hours monitoring Vital signs, bleeding, fundal tone and pain.

## 2022-10-24 NOTE — PROGRESS NOTES
Delivery of male at 200 via primary  due to breech position.  suctioned on field and moved to warmer by RN. APGARS 8/9.

## 2022-10-24 NOTE — OP NOTE
PATIENT:    Debbie Cotter    PREOPERATIVE DIAGNOSES:  1.  39w2d        2. Footling breech presentation      POSTOPERATIVE DIAGNOSES:  Same      PROCEDURE:     Primary low transverse  section. SURGEON:     Qing Bond MD      ESTIMATED BLOOD LOSS:   700 mL. COMPLICATIONS:     None. ANESTHESIA:    Spinal.         FINDINGS:   Live male         Infant Apgars 8 and 9 at 1 and 5 min respectively. Weight: 7 lbs 9 oz. Normal tubes and ovaries bilaterally. DETAILS OF PROCEDURE:   With the patient in the sitting position, spinal anesthesia was given without any complications. She was then placed in the supine position slightly tilted to the left. Abdomen was scrubbed and draped in the usual manner. Anesthesia level was checked and was found to be adequate. The abdomen was entered thru a transverse incision The Bovie was used to go through the subcutaneous tissue. The fascia was entered in the same plane as the skin incision and  from the underlying rectus abdominis muscles which were  in the midline exposing the parietal peritoneum. The peritoneum was opened sharply in a longitudinal fashion. A bladder retractor was placed in position and the visceral peritoneum overlying the lower uterine segment was opened transversely and a bladder flap was developed in a sharp manner. A Mobius OB retractor was placed in position. The lower uterine segment was opened in a low transverse fashion. The amniotic cavity was entered and Clear amniotic fluid was suctioned out. The baby was then delivered from the footling breech position, by complete breech extraction without any complications. The baby was handed over to the nursery nurse. Cord blood was saved. The placenta was then removed manually and the endometrial cavity was swept clean by a wet lap.  The edges of the uterine incision were grasped by Allis clamps and the incision itself was closed using a continuous locked suture of 0 monocryl. Tubes and ovaries were inspected and appeared to be normal. The gutters were cleared of any blood clots and debris. The operative field appeared to be hemostatic. The Mobius retractor was removed. Correct needle, sponge and instrument count was reported and the abdomen was then closed in layers using #1 Stratafix for the fascia and the subcuticular stapler for the skin. Steristrips were applied followed by a sterile dressing and the patient was then transferred to the recovery room in good stable condition.     Kim Negro MD

## 2022-10-24 NOTE — H&P
Department of Obstetrics and Gynecology  Nurse Practitioner Obstetrics History and Physical        CHIEF COMPLAINT:  Scheduled     HISTORY OF PRESENT ILLNESS:  Chichi Smith is a 33 y.o. female , Patient's last menstrual period was 2022.,  at 39w2d. Presents to L&D for scheduled c/s for breech position. Patient is  at 39w2d. Her pregnancy course has been unremarkable with the exception of breech fetal position. She denies health problems, reports that she was a smoker x 10 years but quit with knowledge of the pregnancy. Denies drug/alcohol use. OB History          1    Para        Term                AB        Living   0         SAB        IAB        Ectopic        Molar        Multiple        Live Births                    Estimated Due Date: Estimated Date of Delivery: 10/29/22      Pregnancy complicated by:   Patient Active Problem List   Diagnosis Code    Immunization due Z23    Term pregnancy Z34.90           PAST OB HISTORY  OB History          1    Para        Term                AB        Living   0         SAB        IAB        Ectopic        Molar        Multiple        Live Births                      Past Medical History:      History reviewed. No pertinent past medical history. Past Surgical History:      History reviewed. No pertinent surgical history. Social History:    TOBACCO:   reports that she has been smoking cigarettes. She has been smoking an average of .5 packs per day. She has never used smokeless tobacco.  ETOH:   reports no history of alcohol use. DRUGS:   reports no history of drug use. Family History:       Problem Relation Age of Onset    No Known Problems Mother     No Known Problems Father        Medications Prior to Admission:  Medications Prior to Admission: Prenatal MV-Min-Fe Fum-FA-DHA (PRENATAL 1 PO), Take 1 tablet by mouth daily    Allergies:  Patient has no known allergies.       REVIEW OF SYSTEMS: CONSTITUTIONAL :      No fever, no chills   HEENT :                         Headache absent,   visual disturbances absent  CARDIOVASCULAR :    No chest pain, no palpitations, no edema   RESPIRATORY :            No pain, no shortness of breath   GASTROINTESTINAL : No N/V, no D/C,    abdominal pain absent   GENITOURINARY :      Dysuria   absent,   hematuria absent,   urinary frequency absent  MUSCULOSKELETAL:  No myalgia,   back pain absent  INTEGUMENTARY:  Warm, dry, no rashes  NEUROLOGICAL :        No migraine, no seizures. Pertinent positives and negatives addressed in HPI, other systems reviewed and negative      PHYSICAL EXAM:    /69   Pulse 86   Temp 98.2 °F (36.8 °C) (Oral)   Resp 18   Ht 5' (1.524 m)   Wt 155 lb (70.3 kg)   LMP 2022   SpO2 98%   BMI 30.27 kg/m²     General appearance:  awake, alert, cooperative, no apparent distress, and appears stated age  Neurologic:  Awake, alert, oriented to name, place and time. Ambulatory to unit      Lungs:  Respirations easy and unlabored    Abdomen:   soft, gravid, non-tender,   Fetal position breech by Leopold's   EFW AGA  :  CVA tenderness absent     Fetal heart rate:  Heart rate spot checked at 150's   Membranes:  None      GBS: negative      Impression:  33 y.o.  39w2d, scheduled c/s delivery for breech position. Orders already received by RN from Dr. Ana Fisher:  C/S delivery for breech position.          Electronically signed by LASHA Smith CNM on 10/24/2022 at 6:01 PM

## 2022-10-24 NOTE — ANESTHESIA PROCEDURE NOTES
Spinal Block    Patient location during procedure: OB  End time: 10/24/2022 6:34 PM  Reason for block: procedure for pain, primary anesthetic and at surgeon's request  Staffing  Performed: resident/CRNA   Anesthesiologist: Lissette Ash MD  Resident/CRNA: LASHA Hernandez CRNA  Spinal Block  Patient position: sitting  Prep: Betadine and site prepped and draped  Patient monitoring: cardiac monitor, continuous pulse ox, continuous capnometry, oxygen and frequent blood pressure checks  Approach: midline  Location: L3/L4  Guidance: paresthesia technique  Provider prep: mask and sterile gloves  Local infiltration: lidocaine  Needle  Needle type: Pencan   Needle gauge: 25 G  Needle length: 3.5 in  Assessment  Sensory level: T6  Swirl obtained: Yes  CSF: clear  Attempts: 1  Hemodynamics: stable  Preanesthetic Checklist  Completed: patient identified, IV checked, site marked, risks and benefits discussed, surgical/procedural consents, equipment checked, pre-op evaluation, timeout performed, anesthesia consent given, oxygen available, monitors applied/VS acknowledged, fire risk safety assessment completed and verbalized and blood product R/B/A discussed and consented

## 2022-10-24 NOTE — ANESTHESIA PRE PROCEDURE
Department of Anesthesiology  Preprocedure Note       Name:  Chichi Smith   Age:  33 y.o.  :  1995                                          MRN:  [id number]         Date:  10/24/2022      Surgeon: Cristian Barton):  Nan Mayo MD    Procedure: Procedure(s):   SECTION    Medications prior to admission:   Prior to Admission medications    Medication Sig Start Date End Date Taking? Authorizing Provider   Prenatal MV-Min-Fe Fum-FA-DHA (PRENATAL 1 PO) Take 1 tablet by mouth daily   Yes Historical Provider, MD       Current medications:    Current Facility-Administered Medications   Medication Dose Route Frequency Provider Last Rate Last Admin    lactated ringers infusion   IntraVENous Continuous Amine DEANGELO Bond MD        lactated ringers bolus  1,000 mL IntraVENous Once Amine DEANGELO Bond  mL/hr at 10/24/22 1640 1,000 mL at 10/24/22 1640    sodium chloride flush 0.9 % injection 10 mL  10 mL IntraVENous 2 times per day Amine DEANGELO Bond MD        sodium chloride flush 0.9 % injection 10 mL  10 mL IntraVENous PRN Amine DEANGELO Bond MD        0.9 % sodium chloride infusion   IntraVENous PRN Amine DEANGELO Bond MD        citric acid-sodium citrate (BICITRA) solution 30 mL  30 mL Oral Once Qing Bond MD        cefOXitin (MEFOXIN) 2,000 mg in sodium chloride 0.9 % 50 mL IVPB (Duix2Pqg)  2,000 mg IntraVENous On Call to MD Sonia        oxytocin (PITOCIN) 30 units in 500 mL infusion  87.3 alcides-units/min IntraVENous Continuous PRN Qing Bond MD        And    oxytocin (PITOCIN) 30 units in 500 mL infusion  909 alcides-units/min IntraVENous PRN Qing Bond MD           Allergies:  No Known Allergies    Problem List:    Patient Active Problem List   Diagnosis Code    Immunization due Z23    Term pregnancy Z34.90       Past Medical History:  History reviewed. No pertinent past medical history. Past Surgical History:  History reviewed.  No pertinent surgical history. Social History:    Social History     Tobacco Use    Smoking status: Every Day     Packs/day: 0.50     Types: Cigarettes    Smokeless tobacco: Never   Substance Use Topics    Alcohol use: No                                Ready to quit: Not Answered  Counseling given: Not Answered      Vital Signs (Current):   Vitals:    10/24/22 1640   BP: 126/69   Pulse: 86   Resp: 18   Temp: 98.2 °F (36.8 °C)   TempSrc: Oral   SpO2: 98%   Weight: 155 lb (70.3 kg)   Height: 5' (1.524 m)                                              BP Readings from Last 3 Encounters:   10/24/22 126/69   09/09/20 100/62   04/23/20 108/70       NPO Status:                                                                                 BMI:   Wt Readings from Last 3 Encounters:   10/24/22 155 lb (70.3 kg)   09/09/20 102 lb 12.8 oz (46.6 kg)   04/23/20 112 lb (50.8 kg)     Body mass index is 30.27 kg/m². CBC:   Lab Results   Component Value Date/Time    WBC 11.7 10/24/2022 04:40 PM    RBC 3.95 10/24/2022 04:40 PM    HGB 12.3 10/24/2022 04:40 PM    HCT 36.4 10/24/2022 04:40 PM    MCV 92.2 10/24/2022 04:40 PM    RDW 12.3 10/24/2022 04:40 PM     10/24/2022 04:40 PM       CMP:   Lab Results   Component Value Date/Time    GLUCOSE 86 08/01/2022 02:23 PM       POC Tests: No results for input(s): POCGLU, POCNA, POCK, POCCL, POCBUN, POCHEMO, POCHCT in the last 72 hours.     Coags: No results found for: PROTIME, INR, APTT    HCG (If Applicable): No results found for: PREGTESTUR, PREGSERUM, HCG, HCGQUANT     ABGs: No results found for: PHART, PO2ART, OJL4IAZ, CAK8VKK, BEART, V1BBORUD     Type & Screen (If Applicable):  No results found for: LABABO, LABRH    Drug/Infectious Status (If Applicable):  No results found for: HIV, HEPCAB    COVID-19 Screening (If Applicable): No results found for: COVID19        Anesthesia Evaluation  Patient summary reviewed  Airway: Mallampati: Unable to assess / NA          Dental:          Pulmonary: breath sounds clear to auscultation  (+) current smoker (0.5 PPD. )                           Cardiovascular:Negative CV ROS            Rhythm: regular  Rate: normal                    Neuro/Psych:   Negative Neuro/Psych ROS              GI/Hepatic/Renal: Neg GI/Hepatic/Renal ROS            Endo/Other: Negative Endo/Other ROS                    Abdominal:             Vascular: negative vascular ROS. Other Findings:           Anesthesia Plan      spinal     ASA 2       Induction: intravenous. continuous noninvasive hemodynamic monitor  MIPS: Postoperative opioids intended. Anesthetic plan and risks discussed with patient. Plan discussed with CRNA.     Attending anesthesiologist reviewed and agrees with Vahe Horne MD   10/24/2022

## 2022-10-24 NOTE — PROGRESS NOTES
Patient to unit for scheduled  due to breech presentation. Bear 39 10/29/22 GA . Patient denies leaking of fluid, bleeding or contractions. TOCO and US applied.

## 2022-10-25 LAB
HCT VFR BLD CALC: 30.2 % (ref 34–48)
HEMOGLOBIN: 10.3 G/DL (ref 11.5–15.5)

## 2022-10-25 PROCEDURE — 1220000001 HC SEMI PRIVATE L&D R&B

## 2022-10-25 PROCEDURE — 6360000002 HC RX W HCPCS: Performed by: OBSTETRICS & GYNECOLOGY

## 2022-10-25 PROCEDURE — 51702 INSERT TEMP BLADDER CATH: CPT

## 2022-10-25 PROCEDURE — 6360000002 HC RX W HCPCS: Performed by: ANESTHESIOLOGY

## 2022-10-25 PROCEDURE — 2580000003 HC RX 258: Performed by: OBSTETRICS & GYNECOLOGY

## 2022-10-25 PROCEDURE — 85014 HEMATOCRIT: CPT

## 2022-10-25 PROCEDURE — 6370000000 HC RX 637 (ALT 250 FOR IP): Performed by: OBSTETRICS & GYNECOLOGY

## 2022-10-25 PROCEDURE — 36415 COLL VENOUS BLD VENIPUNCTURE: CPT

## 2022-10-25 PROCEDURE — 85018 HEMOGLOBIN: CPT

## 2022-10-25 RX ORDER — IBUPROFEN 600 MG/1
600 TABLET ORAL EVERY 6 HOURS PRN
Status: DISCONTINUED | OUTPATIENT
Start: 2022-10-25 | End: 2022-10-27 | Stop reason: HOSPADM

## 2022-10-25 RX ADMIN — DOCUSATE SODIUM 100 MG: 100 CAPSULE, LIQUID FILLED ORAL at 08:56

## 2022-10-25 RX ADMIN — KETOROLAC TROMETHAMINE 30 MG: 30 INJECTION, SOLUTION INTRAMUSCULAR at 00:21

## 2022-10-25 RX ADMIN — SODIUM CHLORIDE, POTASSIUM CHLORIDE, SODIUM LACTATE AND CALCIUM CHLORIDE: 600; 310; 30; 20 INJECTION, SOLUTION INTRAVENOUS at 02:56

## 2022-10-25 RX ADMIN — DOCUSATE SODIUM 100 MG: 100 CAPSULE, LIQUID FILLED ORAL at 21:13

## 2022-10-25 RX ADMIN — ACETAMINOPHEN 1000 MG: 500 TABLET ORAL at 19:44

## 2022-10-25 RX ADMIN — KETOROLAC TROMETHAMINE 30 MG: 30 INJECTION, SOLUTION INTRAMUSCULAR at 09:09

## 2022-10-25 RX ADMIN — ACETAMINOPHEN 1000 MG: 500 TABLET ORAL at 09:08

## 2022-10-25 RX ADMIN — OXYCODONE 5 MG: 5 TABLET ORAL at 11:51

## 2022-10-25 RX ADMIN — ENOXAPARIN SODIUM 40 MG: 100 INJECTION SUBCUTANEOUS at 08:59

## 2022-10-25 ASSESSMENT — PAIN SCALES - GENERAL
PAINLEVEL_OUTOF10: 5
PAINLEVEL_OUTOF10: 6
PAINLEVEL_OUTOF10: 5
PAINLEVEL_OUTOF10: 5

## 2022-10-25 ASSESSMENT — PAIN DESCRIPTION - LOCATION
LOCATION: ABDOMEN;INCISION
LOCATION: ABDOMEN
LOCATION: ABDOMEN;INCISION
LOCATION: ABDOMEN;INCISION

## 2022-10-25 ASSESSMENT — PAIN DESCRIPTION - DESCRIPTORS
DESCRIPTORS: OTHER (COMMENT)
DESCRIPTORS: PRESSURE
DESCRIPTORS: CRAMPING

## 2022-10-25 ASSESSMENT — PAIN DESCRIPTION - RADICULAR PAIN: RADICULAR_PAIN: ABSENT

## 2022-10-25 ASSESSMENT — PAIN - FUNCTIONAL ASSESSMENT
PAIN_FUNCTIONAL_ASSESSMENT: ACTIVITIES ARE NOT PREVENTED
PAIN_FUNCTIONAL_ASSESSMENT: ACTIVITIES ARE NOT PREVENTED

## 2022-10-25 NOTE — PROGRESS NOTES
Recovery period complete. Vital Signs, fundal tone and bleeding remained stable. Patient is taken to room 216. Call light within reach. No concerns at this time.

## 2022-10-25 NOTE — PROGRESS NOTES
RN to bedside for night assessment. Vital signs obtained WNL. Fundus and bleeding WNL. Ice water offered and provided. Call light within reach, no concerns at this time, rest encouraged.

## 2022-10-25 NOTE — PLAN OF CARE
Problem: ABCDS Injury Assessment  Goal: Absence of physical injury  Outcome: Progressing     Problem: Pain  Goal: Verbalizes/displays adequate comfort level or baseline comfort level  Outcome: Progressing     Problem: Postpartum  Goal: Experiences normal postpartum course  Description:  Postpartum OB-Pregnancy care plan goal which identifies if the mother is experiencing a normal postpartum course  Outcome: Progressing  Goal: Appropriate maternal -  bonding  Description:  Postpartum OB-Pregnancy care plan goal which identifies if the mother and  are bonding appropriately  Outcome: Progressing  Goal: Establishment of infant feeding pattern  Description:  Postpartum OB-Pregnancy care plan goal which identifies if the mother is establishing a feeding pattern with their   Outcome: Progressing  Goal: Incisions, wounds, or drain sites healing without S/S of infection  Outcome: Progressing     Problem: Infection - Adult  Goal: Absence of infection at discharge  Outcome: Progressing  Goal: Absence of infection during hospitalization  Outcome: Progressing  Goal: Absence of fever/infection during anticipated neutropenic period  Outcome: Progressing     Problem: Safety - Adult  Goal: Free from fall injury  Outcome: Progressing     Problem: Discharge Planning  Goal: Discharge to home or other facility with appropriate resources  Outcome: Progressing

## 2022-10-25 NOTE — ANESTHESIA POSTPROCEDURE EVALUATION
Department of Anesthesiology  Postprocedure Note    Patient: Tera Arana  MRN: [id number]  YOB: 1995  Date of evaluation: 10/24/2022      Procedure Summary     Date: 10/24/22 Room / Location: [facility] [place] L&D OR  [address] [facility]    Anesthesia Start: Marlo Laura Anesthesia Stop:     Procedure:  SECTION (Abdomen) Diagnosis:       Spontaneous breech delivery, single or unspecified fetus      (Spontaneous breech delivery, single or unspecified fetus [O32.1XX0])    Surgeons: Frankie Palma MD Responsible Provider: Vincent Arriola MD    Anesthesia Type: spinal ASA Status: 2          Anesthesia Type: No value filed.     Sanya Phase I:      Sanya Phase II:        Anesthesia Post Evaluation    Patient location during evaluation: PACU  Patient participation: complete - patient participated  Level of consciousness: awake  Pain score: 0  Airway patency: patent  Nausea & Vomiting: no nausea  Complications: no  Cardiovascular status: blood pressure returned to baseline  Respiratory status: acceptable  Hydration status: euvolemic

## 2022-10-25 NOTE — PROGRESS NOTES
Assumed care of patient for 3p-7p shift. Patient sitting in chair, denies pain. Assessment complete.

## 2022-10-25 NOTE — PROGRESS NOTES
Assumed care of patient in the OR, report received at bedside from previous RN. Will relieve circulator duties.

## 2022-10-25 NOTE — PROGRESS NOTES
Subjective:     Postpartum Day 1:  Delivery    The patient feels well. The patient denies emotional concerns. Pain is well controlled with current medications. The baby is well. Urinary output is adequate. The patient is ambulating well. The patient is tolerating a normal diet. Flatus has been passed. Objective:       Vitals:    10/25/22 1611   BP: (!) 111/57   Pulse: 76   Resp: 17   Temp: 98.3 °F (36.8 °C)   SpO2:          General:    alert, appears stated age, and cooperative   Bowel Sounds:  active   Lochia:  appropriate   Uterine Fundus:   firm   Incision:  healing well, no significant drainage, no dehiscence, no significant erythema   DVT Evaluation:  No evidence of DVT seen on physical exam.     CBC   Lab Results   Component Value Date    WBC 11.7 (H) 10/24/2022    HGB 10.3 (L) 10/25/2022    HCT 30.2 (L) 10/25/2022    MCV 92.2 10/24/2022     10/24/2022        Assessment:     Status post  section. Doing well postoperatively.      Plan:     Continue current care

## 2022-10-25 NOTE — PROGRESS NOTES
Care assumed at bedside and POC reviewed. Patient provided menu and instruction on ordereing. Groves dc'd, IV fluid stopped. Patient requesting to get up to chair after breakfast.  Call light within reach.   No needs or c/o

## 2022-10-26 PROCEDURE — 6360000002 HC RX W HCPCS: Performed by: OBSTETRICS & GYNECOLOGY

## 2022-10-26 PROCEDURE — 1220000001 HC SEMI PRIVATE L&D R&B

## 2022-10-26 PROCEDURE — 6370000000 HC RX 637 (ALT 250 FOR IP): Performed by: OBSTETRICS & GYNECOLOGY

## 2022-10-26 RX ADMIN — IBUPROFEN 600 MG: 600 TABLET ORAL at 05:08

## 2022-10-26 RX ADMIN — ACETAMINOPHEN 1000 MG: 500 TABLET ORAL at 23:49

## 2022-10-26 RX ADMIN — DOCUSATE SODIUM 100 MG: 100 CAPSULE, LIQUID FILLED ORAL at 09:11

## 2022-10-26 RX ADMIN — OXYCODONE 10 MG: 5 TABLET ORAL at 15:07

## 2022-10-26 RX ADMIN — ENOXAPARIN SODIUM 40 MG: 100 INJECTION SUBCUTANEOUS at 09:09

## 2022-10-26 RX ADMIN — OXYCODONE 10 MG: 5 TABLET ORAL at 23:49

## 2022-10-26 RX ADMIN — OXYCODONE 10 MG: 5 TABLET ORAL at 09:12

## 2022-10-26 RX ADMIN — DOCUSATE SODIUM 100 MG: 100 CAPSULE, LIQUID FILLED ORAL at 22:13

## 2022-10-26 RX ADMIN — ACETAMINOPHEN 1000 MG: 500 TABLET ORAL at 09:09

## 2022-10-26 RX ADMIN — IBUPROFEN 600 MG: 600 TABLET ORAL at 15:08

## 2022-10-26 ASSESSMENT — PAIN DESCRIPTION - ORIENTATION
ORIENTATION: ANTERIOR
ORIENTATION: LOWER;MID

## 2022-10-26 ASSESSMENT — PAIN DESCRIPTION - DESCRIPTORS
DESCRIPTORS: ACHING
DESCRIPTORS: BURNING;DISCOMFORT
DESCRIPTORS: ACHING

## 2022-10-26 ASSESSMENT — PAIN - FUNCTIONAL ASSESSMENT
PAIN_FUNCTIONAL_ASSESSMENT: ACTIVITIES ARE NOT PREVENTED

## 2022-10-26 ASSESSMENT — PAIN DESCRIPTION - LOCATION
LOCATION: INCISION
LOCATION: INCISION
LOCATION: ABDOMEN;INCISION

## 2022-10-26 ASSESSMENT — PAIN SCALES - GENERAL
PAINLEVEL_OUTOF10: 7
PAINLEVEL_OUTOF10: 8
PAINLEVEL_OUTOF10: 7
PAINLEVEL_OUTOF10: 4
PAINLEVEL_OUTOF10: 7

## 2022-10-26 NOTE — PROGRESS NOTES
Assumed care of patient. Plan of care discussed with patient for the night. Call light within reach. Safe sleep practices reviewed and discussed, patient verbalizes understanding for baby to sleep in crib.

## 2022-10-26 NOTE — PROGRESS NOTES
Hearing screening results were discussed with parent. Questions answered. Brochure given to parent. Advised to monitor developmental milestones and contact physician for any concerns.    Electronically signed by Britany Gamble on 10/26/2022 at 1:04 PM

## 2022-10-26 NOTE — PROGRESS NOTES
Assumed care of patient. Plan of care discussed with patient, who verbalized understanding with no questions. Call light within reach.

## 2022-10-26 NOTE — DISCHARGE SUMMARY
Obstetrical Discharge Form         Patients Name  Abel Egan    Gestational Age:  44w2d    Antepartum complications: Breech presentation    Date of Delivery:   10/24/2022       6:50 PM      Type of Delivery:   , Low Transverse [251]   Rupture Date/time:    No data found      No data found     Presentation:    Breech [3]   Position:                      Anesthesia:    Spinal [252]     Feeding method:         Delivered By:   Hima BRIGHT     Baby:       Information for the patient's :  Bruno Salinas [[id number]]        Intrapartum complications: None  Postpartum complications: none  Discharge Date:   10/26/2022  Discharge Condition: Stable  Disposition:   Home    Plan:   Follow up    in 2 weeks

## 2022-10-27 VITALS
RESPIRATION RATE: 15 BRPM | OXYGEN SATURATION: 99 % | DIASTOLIC BLOOD PRESSURE: 78 MMHG | SYSTOLIC BLOOD PRESSURE: 115 MMHG | HEART RATE: 81 BPM | HEIGHT: 60 IN | BODY MASS INDEX: 30.43 KG/M2 | WEIGHT: 155 LBS | TEMPERATURE: 98 F

## 2022-10-27 PROCEDURE — 6360000002 HC RX W HCPCS: Performed by: OBSTETRICS & GYNECOLOGY

## 2022-10-27 PROCEDURE — 6370000000 HC RX 637 (ALT 250 FOR IP): Performed by: OBSTETRICS & GYNECOLOGY

## 2022-10-27 RX ADMIN — ENOXAPARIN SODIUM 40 MG: 100 INJECTION SUBCUTANEOUS at 08:46

## 2022-10-27 RX ADMIN — DOCUSATE SODIUM 100 MG: 100 CAPSULE, LIQUID FILLED ORAL at 08:46

## 2022-10-27 RX ADMIN — IBUPROFEN 600 MG: 600 TABLET ORAL at 08:03

## 2022-10-27 ASSESSMENT — PAIN DESCRIPTION - LOCATION: LOCATION: ABDOMEN;INCISION

## 2022-10-27 ASSESSMENT — PAIN SCALES - GENERAL: PAINLEVEL_OUTOF10: 5

## 2022-10-27 ASSESSMENT — PAIN DESCRIPTION - DESCRIPTORS: DESCRIPTORS: CRAMPING;SORE

## 2022-10-27 ASSESSMENT — PAIN - FUNCTIONAL ASSESSMENT: PAIN_FUNCTIONAL_ASSESSMENT: ACTIVITIES ARE NOT PREVENTED

## 2022-10-27 ASSESSMENT — PAIN DESCRIPTION - ORIENTATION: ORIENTATION: MID

## 2023-09-03 NOTE — PROGRESS NOTES
Subjective:     Postpartum Day 2:  Delivery    The patient feels well. The patient denies emotional concerns. Pain is well controlled with current medications. The baby is well. Urinary output is adequate. The patient is ambulating well. The patient is tolerating a normal diet. Flatus has been passed. Objective:       Vitals:    10/25/22 2335   BP: 108/71   Pulse: 86   Resp: 16   Temp: 98.1 °F (36.7 °C)   SpO2: 100%         General:    alert, appears stated age, and cooperative   Bowel Sounds:  active   Lochia:  appropriate   Uterine Fundus:   firm   Incision:  healing well, no significant drainage, no dehiscence, no significant erythema   DVT Evaluation:  No evidence of DVT seen on physical exam.     CBC   Lab Results   Component Value Date    WBC 11.7 (H) 10/24/2022    HGB 10.3 (L) 10/25/2022    HCT 30.2 (L) 10/25/2022    MCV 92.2 10/24/2022     10/24/2022        Assessment:     Status post  section. Doing well postoperatively. Plan:     Continue current care. CHIEF COMPLAINT: FU of acute toxic encephalopathy with opioid overdose, sepsis with acute respiratory failure with aspiration pneumonia and klebsiella bacteremia         TODAY ; hgb at 8.5    MEDICATIONS  (STANDING):  apixaban 5 milliGRAM(s) Enteral Tube two times a day  chlorhexidine 2% Cloths 1 Application(s) Topical <User Schedule>  epoetin mejia-epbx (RETACRIT) Injectable 39901 Unit(s) IV Push <User Schedule>  ferrous    sulfate 325 milliGRAM(s) Oral daily  pancrelipase  (CREON  6,000 Lipase Units) 1 Capsule(s) Oral three times a day with meals  pantoprazole    Tablet 40 milliGRAM(s) Oral before breakfast    MEDICATIONS  (PRN):  benzocaine/menthol Lozenge 1 Lozenge Oral four times a day PRN Sore Throat  oxyCODONE    IR 10 milliGRAM(s) Oral every 4 hours PRN Severe Pain (7 - 10)     Vital Signs Last 24 Hrs  T(C): 36.8 (03 Sep 2023 11:27), Max: 37.7 (03 Sep 2023 04:59)  T(F): 98.3 (03 Sep 2023 11:27), Max: 99.9 (03 Sep 2023 04:59)  HR: 108 (03 Sep 2023 11:27) (95 - 108)  BP: 140/81 (03 Sep 2023 11:27) (111/67 - 140/81)  BP(mean): --  RR: 18 (03 Sep 2023 11:27) (17 - 18)  SpO2: 100% (03 Sep 2023 11:27) (95% - 100%)    Parameters below as of 03 Sep 2023 11:27  Patient On (Oxygen Delivery Method): room air              PHYSICAL EXAM:    GENERAL: Malnourished and on room air  CHEST/LUNG:  No wheezing, no crackles   HEART: Regular rate and rhythm; No murmurs  ABDOMEN: Soft, Nontender, Nondistended; Bowel sounds present  EXTREMITIES:  No clubbing, cyanosis, or +edema to rue   Psychiatry: AAO x 3, mood is appropriate         Labs:                               8.5    6.91  )-----------( 84       ( 03 Sep 2023 10:15 )             24.9       CAPILLARY BLOOD GLUCOSE    Culture - Blood (collected 08-26)  Source: .Blood Blood-Peripheral  Preliminary Report (08-28):    No growth at 48 Hours    Culture - Urine (collected 08-25)  Source: Clean Catch Clean Catch (Midstream)  Final Report (08-26):    No growth    Culture - Blood (collected 08-24)  Source: .Blood Blood-Peripheral  Gram Stain (08-27):    Growth in aerobic bottle: Gram Negative Rods  Final Report (08-27):    Growth in aerobic bottle: Klebsiella oxytoca/Raoultella ornithinolytica    Direct identification is available within approximately 3-5    hours either by Blood Panel Multiplexed PCR or Direct    MALDI-TOF. Details: [email or web address]  Organism: Blood Culture PCR  Klebsiella oxytoca /Raoutella ornithinolytica (08-27)  Organism: Klebsiella oxytoca /Raoutella ornithinolytica (08-27)    Sensitivities:      Method Type: TABITHA      -  Amikacin: S <=16      -  Ampicillin: R >16 These ampicillin results predict results for amoxicillin      -  Ampicillin/Sulbactam: S 8/4      -  Aztreonam: S <=4      -  Cefazolin: R 8      -  Cefepime: SDD 4 The breakpoint for susceptible dose dependent may require the usage of a higher cefepime dose (equivalent to adult dose of 2g q8h for normal renal function) for successful treatment.      -  Cefoxitin: S <=8      -  Ceftriaxone: S <=1      -  Ciprofloxacin: S <=0.25      -  Ertapenem: S <=0.5      -  Gentamicin: S <=2      -  Imipenem: S <=1      -  Levofloxacin: S <=0.5      -  Meropenem: S <=1      -  Piperacillin/Tazobactam: S <=8      -  Tobramycin: S <=2      -  Trimethoprim/Sulfamethoxazole: S 1/19  Organism: Blood Culture PCR (08-27)    Sensitivities:      Method Type: PCR      -  Klebsiella oxytoca: Detec    Culture - Blood (collected 08-24)  Source: .Blood Blood-Peripheral  Preliminary Report (08-29):    No growth at 4 days

## 2024-03-08 ENCOUNTER — HOSPITAL ENCOUNTER (EMERGENCY)
Age: 29
Discharge: HOME | End: 2024-03-08
Attending: EMERGENCY MEDICINE
Payer: COMMERCIAL

## 2024-03-08 VITALS
BODY MASS INDEX: 22.46 KG/M2 | OXYGEN SATURATION: 96 % | HEART RATE: 89 BPM | RESPIRATION RATE: 16 BRPM | WEIGHT: 115 LBS | SYSTOLIC BLOOD PRESSURE: 112 MMHG | TEMPERATURE: 98.9 F | DIASTOLIC BLOOD PRESSURE: 68 MMHG

## 2024-03-08 DIAGNOSIS — J02.0 STREP PHARYNGITIS: Primary | ICD-10-CM

## 2024-03-08 PROCEDURE — 99283 EMERGENCY DEPT VISIT LOW MDM: CPT

## 2024-03-08 RX ORDER — METHYLPREDNISOLONE 4 MG/1
TABLET ORAL
Qty: 1 KIT | Refills: 0 | Status: SHIPPED | OUTPATIENT
Start: 2024-03-08 | End: 2024-03-14

## 2024-03-08 RX ORDER — FLUCONAZOLE 150 MG/1
150 TABLET ORAL ONCE
Qty: 1 TABLET | Refills: 0 | Status: SHIPPED | OUTPATIENT
Start: 2024-03-08 | End: 2024-03-08

## 2024-03-08 RX ORDER — AMOXICILLIN AND CLAVULANATE POTASSIUM 875; 125 MG/1; MG/1
1 TABLET, FILM COATED ORAL 2 TIMES DAILY
Qty: 20 TABLET | Refills: 0 | Status: SHIPPED | OUTPATIENT
Start: 2024-03-08 | End: 2024-03-18

## 2024-03-08 ASSESSMENT — PAIN DESCRIPTION - ONSET: ONSET: ON-GOING

## 2024-03-08 ASSESSMENT — LIFESTYLE VARIABLES
HOW OFTEN DO YOU HAVE A DRINK CONTAINING ALCOHOL: NEVER
HOW MANY STANDARD DRINKS CONTAINING ALCOHOL DO YOU HAVE ON A TYPICAL DAY: PATIENT DOES NOT DRINK

## 2024-03-08 ASSESSMENT — PAIN SCALES - GENERAL: PAINLEVEL_OUTOF10: 10

## 2024-03-08 ASSESSMENT — PAIN DESCRIPTION - PAIN TYPE: TYPE: ACUTE PAIN

## 2024-03-08 ASSESSMENT — PAIN - FUNCTIONAL ASSESSMENT: PAIN_FUNCTIONAL_ASSESSMENT: 0-10

## 2024-03-08 ASSESSMENT — PAIN DESCRIPTION - LOCATION: LOCATION: HEAD

## 2024-03-08 ASSESSMENT — PAIN DESCRIPTION - FREQUENCY: FREQUENCY: CONTINUOUS

## 2024-03-08 NOTE — ED PROVIDER NOTES
[facility] EMERGENCY DEPARTMENT  EMERGENCY DEPARTMENT ENCOUNTER        Pt Name: Debbie Cotter  MRN: [id number]  Birthdate 1995  Date of evaluation: 3/8/2024  Provider: Sofia Ramos DO  PCP: Kyara Sanderson DO  Note Started: 1:48 PM EST 3/8/24    CHIEF COMPLAINT       Chief Complaint   Patient presents with    URI     Ongoing a week, chills sore throat, headache        HISTORY OF PRESENT ILLNESS: 1 or more Elements   History From: patient    Limitations to history : None    Debbie Cotter is a 28 y.o. female who presents with sore throat, sinus congestion, headache, chills and body aches beginning one week ago.  States every thing is better now except the sore throat.  The complaint has been persistent, moderate in severity, and worsened by nothing.  Patient denies cough, congestion, chest pain, shortness of breath, edema, visual disturbances, focal paresthesias, focal weakness, abdominal pain, nausea, vomiting, diarrhea, constipation, dysuria, hematuria, trauma, neck or back pain, rash or other complaints.        Nursing Notes were all reviewed and agreed with or any disagreements were addressed in the HPI.    REVIEW OF SYSTEMS :           Positives and Pertinent negatives as per HPI.     SURGICAL HISTORY     Past Surgical History:   Procedure Laterality Date     SECTION N/A 10/24/2022     SECTION performed by Qing Bond MD at [facility] L&D OR       CURRENTMEDICATIONS       Previous Medications    No medications on file       ALLERGIES     Patient has no known allergies.    FAMILYHISTORY       Family History   Problem Relation Age of Onset    No Known Problems Mother     No Known Problems Father         SOCIAL HISTORY       Social History     Tobacco Use    Smoking status: Every Day     Current packs/day: 0.50     Types: Cigarettes    Smokeless tobacco: Never   Substance Use Topics    Alcohol use: No    Drug use: No       SCREENINGS         dictation.    PAST MEDICAL HISTORY/Chronic Conditions Affecting Care      has a past medical history of 39 weeks gestation of pregnancy (10/24/2022).     EMERGENCY DEPARTMENT COURSE    Vitals:    Vitals:    03/08/24 1322 03/08/24 1345   BP:  112/68   Pulse:  89   Resp:  16   Temp:  98.9 °F (37.2 °C)   TempSrc:  Oral   SpO2:  96%   Weight: 52.2 kg (115 lb)        Patient was given the following medications:  Medications - No data to display        Is this patient to be included in the SEP-1 Core Measure due to severe sepsis or septic shock?   No Exclusion criteria - the patient is NOT to be included for SEP-1 Core Measure due to: 2+ SIRS criteria are not met        Medical Decision Making/Differential Diagnosis:    CC/HPI Summary, Social Determinants of health, Records Reviewed, DDx, testing done/not done, ED Course, Reassessment, disposition considerations/shared decision making with patient, consults, disposition:            MDM:   Centor Score (MODIFIED) For Strep Pharyngitis:    Age 3-14 Years   no (0)     Age 15-44 Years   yes (1)     Age >45 Years   NO     Exudate or Swelling on Tonsils   yes (1)     Tender/Swollen Anterior Cervical Nodes   yes (1)     Fever? (T > 38°C, 100.4°F)   no (0)     Absence of Cough   yes (1)   TOTAL POINTS   4   Score of Zero = Probability of Strep Pharyngitis: 1% - 2.5%.,   No further testing nor antibiotics.  Score of 1 = Probability of Strep Pharyngitis: 5% - 10%.,   No further testing nor antibiotics.  Score of 2 = Probability of Strep Phar: 11% - 17%.   Culture/test all, ATB's only for positive culture results.  Score of 3 = Probability of Strep Phar: 28% - 35%.   Culture/test all, ATB's only for positive culture results  Score of 4 or 5 = Probability of Strep Pharyngitis: 51% - 53%.     Treat empirically with antibiotics.    Shared decision making was used to determine testing, treatments and disposition.      Counseling:  The emergency provider has spoken with the patient and

## 2024-03-08 NOTE — DISCHARGE INSTR - COC
Continuity of Care Form    Patient Name: Debbie Cotter   :  1995  MRN:  [id number]    Admit date:  3/8/2024  Discharge date:  ***    Code Status Order: Prior   Advance Directives:     Admitting Physician:  No admitting provider for patient encounter.  PCP: Kyara Sanderson DO    Discharging Nurse: ***  Discharging Hospital Unit/Room#: [facility]  Discharging Unit Phone Number: ***    Emergency Contact:   Extended Emergency Contact Information  Primary Emergency Contact: Georgie Cotter  Address: [address] [place] [address]  Home Phone: [phone]  Relation: Parent    Past Surgical History:  Past Surgical History:   Procedure Laterality Date     SECTION N/A 10/24/2022     SECTION performed by Qing Bond MD at [facility] L[facility]D OR       Immunization History:   Immunization History   Administered Date(s) Administered    TDaP, ADACEL (age 10y-64y), BOOSTRIX (age 10y+), IM, 0.5mL 11/15/2017       Active Problems:  Patient Active Problem List   Diagnosis Code    Immunization due Z23    Term pregnancy Z34.90     delivery delivered O82    39 weeks gestation of pregnancy Z3A.39    Footling breech presentation O32.8XX0       Isolation/Infection:   Isolation            No Isolation          Patient Infection Status       None to display            Nurse Assessment:  Last Vital Signs: /68   Pulse 89   Temp 98.9 °F (37.2 °C) (Oral)   Resp 16   Wt 52.2 kg (115 lb)   SpO2 96%   BMI 22.46 kg/m²     Last documented pain score (0-10 scale): Pain Level: 10  Last Weight:   Wt Readings from Last 1 Encounters:   24 52.2 kg (115 lb)     Mental Status:  {IP PT MENTAL STATUS:66150}    IV Access:  { WILL IV ACCESS:224660140}    Nursing Mobility/ADLs:  Walking   {CHP DME ADLs:091940249}  Transfer  {CHP DME ADLs:826351668}  Bathing  {CHP DME ADLs:249454880}  Dressing  {CHP DME ADLs:606818013}  Toileting  {CHP DME ADLs:223222568}  Feeding  {CHP DME  {Esignature:600225191}    PHYSICIAN SECTION    Prognosis: {Prognosis:6298870685}    Condition at Discharge: { Patient Condition:112769722}    Rehab Potential (if transferring to Rehab): {Prognosis:8326600507}    Recommended Labs or Other Treatments After Discharge: ***    Physician Certification: I certify the above information and transfer of Debbie Cotter  is necessary for the continuing treatment of the diagnosis listed and that she requires {Admit to Appropriate Level of Care:90063} for {GREATER/LESS:909127712} 30 days.     Update Admission H&P: {CHP DME Changes in HandP:079562269}    PHYSICIAN SIGNATURE:  {Esignature:480734413}

## 2024-03-14 ENCOUNTER — HOSPITAL ENCOUNTER (EMERGENCY)
Age: 29
Discharge: HOME | End: 2024-03-15
Attending: STUDENT IN AN ORGANIZED HEALTH CARE EDUCATION/TRAINING PROGRAM
Payer: COMMERCIAL

## 2024-03-14 ENCOUNTER — APPOINTMENT (OUTPATIENT)
Dept: CT IMAGING | Age: 29
End: 2024-03-14
Payer: COMMERCIAL

## 2024-03-14 ENCOUNTER — HOSPITAL ENCOUNTER (EMERGENCY)
Age: 29
Discharge: ANOTHER HOSPITAL | End: 2024-03-14
Attending: EMERGENCY MEDICINE
Payer: COMMERCIAL

## 2024-03-14 VITALS
WEIGHT: 115 LBS | HEART RATE: 100 BPM | TEMPERATURE: 98.1 F | RESPIRATION RATE: 18 BRPM | DIASTOLIC BLOOD PRESSURE: 65 MMHG | SYSTOLIC BLOOD PRESSURE: 107 MMHG | OXYGEN SATURATION: 99 % | BODY MASS INDEX: 22.58 KG/M2 | HEIGHT: 60 IN

## 2024-03-14 DIAGNOSIS — J36 TONSILLAR ABSCESS: Primary | ICD-10-CM

## 2024-03-14 DIAGNOSIS — J36 PERITONSILLAR ABSCESS: Primary | ICD-10-CM

## 2024-03-14 LAB
ALBUMIN SERPL-MCNC: 4.4 G/DL (ref 3.5–5.2)
ALP SERPL-CCNC: 89 U/L (ref 35–104)
ALT SERPL-CCNC: 15 U/L (ref 0–32)
ANION GAP SERPL CALCULATED.3IONS-SCNC: 10 MMOL/L (ref 7–16)
AST SERPL-CCNC: 14 U/L (ref 0–31)
BASOPHILS # BLD: 0.04 K/UL (ref 0–0.2)
BASOPHILS NFR BLD: 0 % (ref 0–2)
BILIRUB SERPL-MCNC: 0.3 MG/DL (ref 0–1.2)
BUN SERPL-MCNC: 10 MG/DL (ref 6–20)
CALCIUM SERPL-MCNC: 9.4 MG/DL (ref 8.6–10.2)
CHLORIDE SERPL-SCNC: 102 MMOL/L (ref 98–107)
CO2 SERPL-SCNC: 26 MMOL/L (ref 22–29)
CREAT SERPL-MCNC: 1 MG/DL (ref 0.5–1)
EOSINOPHIL # BLD: 0.14 K/UL (ref 0.05–0.5)
EOSINOPHILS RELATIVE PERCENT: 1 % (ref 0–6)
ERYTHROCYTE [DISTWIDTH] IN BLOOD BY AUTOMATED COUNT: 12.3 % (ref 11.5–15)
GFR SERPL CREATININE-BSD FRML MDRD: >60 ML/MIN/1.73M2
GLUCOSE SERPL-MCNC: 92 MG/DL (ref 74–99)
HCG UR QL: NEGATIVE
HCT VFR BLD AUTO: 42.6 % (ref 34–48)
HGB BLD-MCNC: 14.4 G/DL (ref 11.5–15.5)
IMM GRANULOCYTES # BLD AUTO: 0.13 K/UL (ref 0–0.58)
IMM GRANULOCYTES NFR BLD: 1 % (ref 0–5)
LYMPHOCYTES NFR BLD: 2.23 K/UL (ref 1.5–4)
LYMPHOCYTES RELATIVE PERCENT: 10 % (ref 20–42)
MCH RBC QN AUTO: 30.8 PG (ref 26–35)
MCHC RBC AUTO-ENTMCNC: 33.8 G/DL (ref 32–34.5)
MCV RBC AUTO: 91 FL (ref 80–99.9)
MONOCYTES NFR BLD: 0.66 K/UL (ref 0.1–0.95)
MONOCYTES NFR BLD: 3 % (ref 2–12)
NEUTROPHILS NFR BLD: 86 % (ref 43–80)
NEUTS SEG NFR BLD: 18.97 K/UL (ref 1.8–7.3)
PLATELET # BLD AUTO: 316 K/UL (ref 130–450)
PMV BLD AUTO: 9.2 FL (ref 7–12)
POTASSIUM SERPL-SCNC: 4 MMOL/L (ref 3.5–5)
PROT SERPL-MCNC: 7.7 G/DL (ref 6.4–8.3)
RBC # BLD AUTO: 4.68 M/UL (ref 3.5–5.5)
SODIUM SERPL-SCNC: 138 MMOL/L (ref 132–146)
WBC OTHER # BLD: 22.2 K/UL (ref 4.5–11.5)

## 2024-03-14 PROCEDURE — 2580000003 HC RX 258: Performed by: NURSE PRACTITIONER

## 2024-03-14 PROCEDURE — 99283 EMERGENCY DEPT VISIT LOW MDM: CPT

## 2024-03-14 PROCEDURE — 80053 COMPREHEN METABOLIC PANEL: CPT

## 2024-03-14 PROCEDURE — 85025 COMPLETE CBC W/AUTO DIFF WBC: CPT

## 2024-03-14 PROCEDURE — 6360000002 HC RX W HCPCS: Performed by: NURSE PRACTITIONER

## 2024-03-14 PROCEDURE — 6360000004 HC RX CONTRAST MEDICATION: Performed by: STUDENT IN AN ORGANIZED HEALTH CARE EDUCATION/TRAINING PROGRAM

## 2024-03-14 PROCEDURE — 96365 THER/PROPH/DIAG IV INF INIT: CPT

## 2024-03-14 PROCEDURE — 84703 CHORIONIC GONADOTROPIN ASSAY: CPT

## 2024-03-14 PROCEDURE — 99285 EMERGENCY DEPT VISIT HI MDM: CPT

## 2024-03-14 PROCEDURE — 70491 CT SOFT TISSUE NECK W/DYE: CPT

## 2024-03-14 PROCEDURE — 96375 TX/PRO/DX INJ NEW DRUG ADDON: CPT

## 2024-03-14 PROCEDURE — 87040 BLOOD CULTURE FOR BACTERIA: CPT

## 2024-03-14 PROCEDURE — 96366 THER/PROPH/DIAG IV INF ADDON: CPT

## 2024-03-14 RX ORDER — 0.9 % SODIUM CHLORIDE 0.9 %
1000 INTRAVENOUS SOLUTION INTRAVENOUS ONCE
Status: COMPLETED | OUTPATIENT
Start: 2024-03-14 | End: 2024-03-14

## 2024-03-14 RX ORDER — DEXAMETHASONE SODIUM PHOSPHATE 10 MG/ML
10 INJECTION INTRAMUSCULAR; INTRAVENOUS ONCE
Status: COMPLETED | OUTPATIENT
Start: 2024-03-14 | End: 2024-03-14

## 2024-03-14 RX ORDER — DEXAMETHASONE SODIUM PHOSPHATE 10 MG/ML
6 INJECTION INTRAMUSCULAR; INTRAVENOUS ONCE
Status: DISCONTINUED | OUTPATIENT
Start: 2024-03-14 | End: 2024-03-14

## 2024-03-14 RX ADMIN — AMPICILLIN SODIUM AND SULBACTAM SODIUM 3000 MG: 2; 1 INJECTION, POWDER, FOR SOLUTION INTRAMUSCULAR; INTRAVENOUS at 21:34

## 2024-03-14 RX ADMIN — IOPAMIDOL 75 ML: 755 INJECTION, SOLUTION INTRAVENOUS at 21:43

## 2024-03-14 RX ADMIN — DEXAMETHASONE SODIUM PHOSPHATE 10 MG: 10 INJECTION INTRAMUSCULAR; INTRAVENOUS at 21:26

## 2024-03-14 RX ADMIN — SODIUM CHLORIDE 1000 ML: 9 INJECTION, SOLUTION INTRAVENOUS at 21:29

## 2024-03-14 ASSESSMENT — PAIN DESCRIPTION - LOCATION: LOCATION: THROAT

## 2024-03-14 ASSESSMENT — PAIN DESCRIPTION - PAIN TYPE: TYPE: ACUTE PAIN

## 2024-03-14 ASSESSMENT — PAIN - FUNCTIONAL ASSESSMENT: PAIN_FUNCTIONAL_ASSESSMENT: 0-10

## 2024-03-14 ASSESSMENT — PAIN SCALES - GENERAL: PAINLEVEL_OUTOF10: 5

## 2024-03-14 ASSESSMENT — PAIN DESCRIPTION - ONSET: ONSET: ON-GOING

## 2024-03-14 ASSESSMENT — PAIN DESCRIPTION - FREQUENCY: FREQUENCY: CONTINUOUS

## 2024-03-14 ASSESSMENT — PAIN DESCRIPTION - DESCRIPTORS: DESCRIPTORS: ACHING

## 2024-03-15 VITALS
RESPIRATION RATE: 16 BRPM | BODY MASS INDEX: 22.58 KG/M2 | OXYGEN SATURATION: 97 % | SYSTOLIC BLOOD PRESSURE: 104 MMHG | HEIGHT: 60 IN | DIASTOLIC BLOOD PRESSURE: 70 MMHG | HEART RATE: 88 BPM | WEIGHT: 115 LBS | TEMPERATURE: 98.1 F

## 2024-03-15 PROCEDURE — 6370000000 HC RX 637 (ALT 250 FOR IP)

## 2024-03-15 RX ORDER — CLINDAMYCIN HYDROCHLORIDE 300 MG/1
600 CAPSULE ORAL 3 TIMES DAILY
Qty: 42 CAPSULE | Refills: 0 | Status: SHIPPED | OUTPATIENT
Start: 2024-03-15 | End: 2024-03-22

## 2024-03-15 RX ORDER — METHYLPREDNISOLONE 4 MG/1
TABLET ORAL
Qty: 1 KIT | Refills: 0 | Status: SHIPPED | OUTPATIENT
Start: 2024-03-15 | End: 2024-03-21

## 2024-03-15 RX ADMIN — BENZOCAINE, BUTAMBEN, AND TETRACAINE HYDROCHLORIDE 1 SPRAY: .028; .004; .004 AEROSOL, SPRAY TOPICAL at 02:11

## 2024-03-15 ASSESSMENT — PAIN SCALES - GENERAL: PAINLEVEL_OUTOF10: 5

## 2024-03-15 ASSESSMENT — LIFESTYLE VARIABLES
HOW MANY STANDARD DRINKS CONTAINING ALCOHOL DO YOU HAVE ON A TYPICAL DAY: PATIENT DOES NOT DRINK
HOW OFTEN DO YOU HAVE A DRINK CONTAINING ALCOHOL: NEVER

## 2024-03-15 NOTE — CONSULTS
OTOLARYNGOLOGY  CONSULT NOTE  3/15/2024    Physician Consulted: Dr. Elliott  Reason for Consult: tonsillar abscess  Referring Physician: Dr. Obey HOOKS  Debbie Cotter is a 28 y.o. female who presents for evaluation of a right intratonsillar abscess. She was recently seen in the ED at [facility] on 3/8/24 for 1 week of sore throat which had progressively worsened. She was diagnosed with strep pharyngitis and discharged home with augmentin, diflucan, and a medrol dosepak. Her symptoms failed to improve and she returned to [facility] ED last evening where a CT of the neck was performed which demonstrated a large >3cm right intratonsillar abscess. I discussed this case with Lenore Mauricio, the ED provider in [place], and recommended the patient be transferred to [facility] ED for I&D of her intratonsillar abscess. Currently, she is tolerating her oral secretions and has no difficulty breathing. She has never had a tonsillar/peritonsillar abscess in the past.       Past Medical History:   Diagnosis Date    39 weeks gestation of pregnancy 10/24/2022       Past Surgical History:   Procedure Laterality Date     SECTION N/A 10/24/2022     SECTION performed by Qing Bond MD at [facility] L[facility] OR       Medications Prior to Admission:    Prior to Admission medications    Medication Sig Start Date End Date Taking? Authorizing Provider   clindamycin (CLEOCIN) 300 MG capsule Take 2 capsules by mouth 3 times daily for 7 days 3/15/24 3/22/24 Yes Mirna Delgado DO   methylPREDNISolone (MEDROL, DASHA,) 4 MG tablet Take by mouth. 3/15/24 3/21/24 Yes Mirna Delgado DO       No Known Allergies    Family History   Problem Relation Age of Onset    No Known Problems Mother     No Known Problems Father        Social History     Tobacco Use    Smoking status: Every Day     Current packs/day: 0.50     Types: Cigarettes    Smokeless tobacco: Never   Substance Use Topics    Alcohol use: No    Drug use: No

## 2024-03-15 NOTE — ED PROVIDER NOTES
tissues of maxillofacial area. BRAIN/ORBITS/SINUSES:  The visualized portion of the intracranial contents appear unremarkable.  The visualized portion of the orbits, paranasal sinuses and mastoid air cells demonstrate no acute abnormality. LUNG APICES/SUPERIOR MEDIASTINUM:  No focal consolidation is seen within the visualized lung apices.  No superior mediastinal lymphadenopathy or mass. The visualized portion of the trachea appears unremarkable. BONES:  No aggressive appearing lytic or blastic bony lesion.     1. Acute right palatine tonsillitis with large abscess measuring up to 3.2 cm. 2. Enlarged right jugulodigastric lymph node measuring up to 2.8 cm.       No results found.    PROCEDURES   Unless otherwise noted below, none     Procedures    CRITICAL CARE TIME (.cct)   0    PAST MEDICAL HISTORY/Chronic Conditions Affecting Care      has a past medical history of 39 weeks gestation of pregnancy (10/24/2022).     EMERGENCY DEPARTMENT COURSE    Vitals:    Vitals:    03/15/24 0003 03/15/24 0006   BP: 104/70    Pulse: 88    Resp: 16    Temp: 98.1 °F (36.7 °C)    TempSrc: Oral    SpO2: 97%    Weight:  52.2 kg (115 lb)   Height:  1.524 m (5')       Patient was given the following medications:  Medications   butamben-tetracaine-benzocaine (CETACAINE) spray 1 spray (1 spray Topical Given 3/15/24 0211)           Medical Decision Making/Differential Diagnosis:    CC/HPI Summary, Social Determinants of health, Records Reviewed, DDx, testing done/not done, ED Course, Reassessment, disposition considerations/shared decision making with patient, consults, disposition:        The patient is a 28-year-old female presenting to the emergency department for peritonsillar abscess.  Patient is hemodynamically stable, nontoxic, and in no acute distress.     Patient is a daily smoker which is a known social detriment to her health. Records reviewed from [place] emergency department including CBC which showed white blood cell count

## 2024-03-15 NOTE — DISCHARGE INSTRUCTIONS
Follow-up with ENT.  Return for any significant worsening symptoms or other acute symptoms or concerns.

## 2024-03-15 NOTE — ED PROVIDER NOTES
fine to drive she has not been given any narcotics in the ED.  Patient states that she will go directly to the emergency department.  Patient at this time is hemodynamically stable neurologically vascularly intact and is stable for transfer to [place].    I am the Primary Clinician of Record.    FINAL IMPRESSION      1. Tonsillar abscess          DISPOSITION/PLAN     DISPOSITION Decision To Transfer 03/14/2024 10:25:09 PM      PATIENT REFERRED TO:  Kyara Sanderson DO  [address] [facility] [address]  [phone]          Aria Elliott MD  [address] [facility] [address]  [phone]            DISCHARGE MEDICATIONS:  New Prescriptions    No medications on file       DISCONTINUED MEDICATIONS:  Discontinued Medications    No medications on file              (Please note that portions of this note were completed with a voice recognition program.  Efforts were made to edit the dictations but occasionally words are mis-transcribed.)    LASHA Lopes CNP (electronically signed)         Lenore Mauricio APRN - CNP  03/14/24 2234       Lenore Mauricio APRN - CNP  03/14/24 2231

## 2024-03-17 LAB
MICROORGANISM SPEC CULT: NORMAL
MICROORGANISM SPEC CULT: NORMAL
SERVICE CMNT-IMP: NORMAL
SERVICE CMNT-IMP: NORMAL
SPECIMEN DESCRIPTION: NORMAL
SPECIMEN DESCRIPTION: NORMAL

## 2024-03-20 LAB
MICROORGANISM SPEC CULT: NORMAL
MICROORGANISM SPEC CULT: NORMAL
SERVICE CMNT-IMP: NORMAL
SERVICE CMNT-IMP: NORMAL
SPECIMEN DESCRIPTION: NORMAL
SPECIMEN DESCRIPTION: NORMAL

## (undated) DEVICE — STAPLER SKIN SQ 30 ABSRB STPL DISP INSORB

## (undated) DEVICE — STANDARD HYPODERMIC NEEDLE,POLYPROPYLENE HUB: Brand: MONOJECT

## (undated) DEVICE — SUTURE STRATAFIX SYMMETRIC SZ 1 L18IN ABSRB VLT CT1 L36CM SXPP1A404

## (undated) DEVICE — BLADE CLIPPER GEN PURP NS

## (undated) DEVICE — SCALPEL SURG NO21 S STL STR W/ INTEGR MTRC RUL DISP

## (undated) DEVICE — TRAY CATH CATH OD16FR 200ML URIN M SIL CNTR ENTRY F

## (undated) DEVICE — PAD ABD CURITY TENDERSORB 5X9IN

## (undated) DEVICE — CESAREAN BIRTH PACK: Brand: MEDLINE INDUSTRIES, INC.

## (undated) DEVICE — SLEEVE COMPRESS STD CALF KNEE SCD

## (undated) DEVICE — LINER,SOFT,SUCTION CANISTER,1500CC: Brand: MEDLINE

## (undated) DEVICE — SCALPEL SURG NO10 S STL ABS PLAS HNDL DISPOSABLE

## (undated) DEVICE — CHLORAPREP 26ML ORANGE

## (undated) DEVICE — RETRACTOR SURG M-L RNG DIA17CM INCIS 15CM ELAS SELF RET BLNT

## (undated) DEVICE — GLOVE ORANGE PI 7 1/2   MSG9075

## (undated) DEVICE — GLOVE SURG SZ 65 THK91MIL LTX FREE SYN POLYISOPRENE

## (undated) DEVICE — 3M™ STERI-STRIP™ ELASTIC SKIN CLOSURES, E4547, 1/2 IN X 4 IN (12 MM X 100 MM), 6 STRIPS/ENVELOPE: Brand: 3M™ STERI-STRIP™

## (undated) DEVICE — DRESSING SUPERABSORBENT W4XL4IN 4 LAYR NONWOVEN FLD

## (undated) DEVICE — MASTISOL ADHESIVE LIQ 2/3ML

## (undated) DEVICE — GOWN,SIRUS,FABRNF,XL,20/CS: Brand: MEDLINE

## (undated) DEVICE — TELFA ADHESIVE ISLAND DRESSING: Brand: TELFA

## (undated) DEVICE — TUBE BLD COLLECT ST 1 SIL COAT 7ML 10ML

## (undated) DEVICE — SUTURE MCRYL SZ 0 L27IN ABSRB VLT CT-1 L36MM 1/2 CIR TAPR Y340H

## (undated) DEVICE — ELECTRODE PT RET AD L9FT HI MOIST COND ADH HYDRGEL CORDED

## (undated) DEVICE — SUTURE VCRL SZ 1 L27IN ABSRB VLT L36MM CT-1 1/2 CIR J341H

## (undated) DEVICE — GLOVE ORANGE PI 7   MSG9070